# Patient Record
Sex: MALE | Race: WHITE | Employment: OTHER | URBAN - METROPOLITAN AREA
[De-identification: names, ages, dates, MRNs, and addresses within clinical notes are randomized per-mention and may not be internally consistent; named-entity substitution may affect disease eponyms.]

---

## 2017-06-22 ENCOUNTER — TELEPHONE (OUTPATIENT)
Dept: FAMILY MEDICINE CLINIC | Age: 62
End: 2017-06-22

## 2017-09-21 ENCOUNTER — TELEPHONE (OUTPATIENT)
Dept: FAMILY MEDICINE CLINIC | Age: 62
End: 2017-09-21

## 2018-05-24 ENCOUNTER — OFFICE VISIT (OUTPATIENT)
Dept: FAMILY MEDICINE CLINIC | Age: 63
End: 2018-05-24
Payer: MEDICARE

## 2018-05-24 VITALS
HEART RATE: 81 BPM | HEIGHT: 70 IN | BODY MASS INDEX: 31.35 KG/M2 | SYSTOLIC BLOOD PRESSURE: 136 MMHG | DIASTOLIC BLOOD PRESSURE: 78 MMHG | WEIGHT: 219 LBS | TEMPERATURE: 99.1 F | RESPIRATION RATE: 18 BRPM | OXYGEN SATURATION: 96 %

## 2018-05-24 DIAGNOSIS — N52.9 ERECTILE DYSFUNCTION, UNSPECIFIED ERECTILE DYSFUNCTION TYPE: ICD-10-CM

## 2018-05-24 DIAGNOSIS — J43.8 OTHER EMPHYSEMA (HCC): Primary | ICD-10-CM

## 2018-05-24 DIAGNOSIS — I48.0 PAROXYSMAL ATRIAL FIBRILLATION (HCC): ICD-10-CM

## 2018-05-24 PROCEDURE — 99213 OFFICE O/P EST LOW 20 MIN: CPT | Performed by: INTERNAL MEDICINE

## 2018-05-24 PROCEDURE — 93000 ELECTROCARDIOGRAM COMPLETE: CPT | Performed by: INTERNAL MEDICINE

## 2018-05-24 RX ORDER — SILDENAFIL 100 MG/1
100 TABLET, FILM COATED ORAL PRN
Qty: 30 TABLET | Refills: 3 | Status: SHIPPED | OUTPATIENT
Start: 2018-05-24 | End: 2021-01-20

## 2018-05-24 ASSESSMENT — ENCOUNTER SYMPTOMS
COUGH: 1
STRIDOR: 0
ABDOMINAL PAIN: 0
TROUBLE SWALLOWING: 0
CHEST TIGHTNESS: 0
WHEEZING: 0
SHORTNESS OF BREATH: 0
VOMITING: 0
VOICE CHANGE: 0
BLOOD IN STOOL: 0
CONSTIPATION: 0
DIARRHEA: 0
NAUSEA: 0

## 2018-05-24 ASSESSMENT — PATIENT HEALTH QUESTIONNAIRE - PHQ9
1. LITTLE INTEREST OR PLEASURE IN DOING THINGS: 0
SUM OF ALL RESPONSES TO PHQ9 QUESTIONS 1 & 2: 0
SUM OF ALL RESPONSES TO PHQ QUESTIONS 1-9: 0
2. FEELING DOWN, DEPRESSED OR HOPELESS: 0

## 2018-06-29 ENCOUNTER — TELEPHONE (OUTPATIENT)
Dept: FAMILY MEDICINE CLINIC | Age: 63
End: 2018-06-29

## 2018-06-29 ENCOUNTER — OFFICE VISIT (OUTPATIENT)
Dept: FAMILY MEDICINE CLINIC | Age: 63
End: 2018-06-29
Payer: MEDICARE

## 2018-06-29 VITALS
TEMPERATURE: 98.3 F | DIASTOLIC BLOOD PRESSURE: 80 MMHG | SYSTOLIC BLOOD PRESSURE: 120 MMHG | HEART RATE: 68 BPM | OXYGEN SATURATION: 98 %

## 2018-06-29 DIAGNOSIS — J44.1 COPD EXACERBATION (HCC): ICD-10-CM

## 2018-06-29 DIAGNOSIS — J40 BRONCHITIS: Primary | ICD-10-CM

## 2018-06-29 PROCEDURE — 99213 OFFICE O/P EST LOW 20 MIN: CPT | Performed by: NURSE PRACTITIONER

## 2018-06-29 RX ORDER — ALBUTEROL SULFATE 90 UG/1
2 AEROSOL, METERED RESPIRATORY (INHALATION) EVERY 4 HOURS PRN
Qty: 1 INHALER | Refills: 3 | Status: SHIPPED | OUTPATIENT
Start: 2018-06-29 | End: 2020-01-15

## 2018-06-29 RX ORDER — AMOXICILLIN 875 MG/1
875 TABLET, COATED ORAL 2 TIMES DAILY
Qty: 20 TABLET | Refills: 0 | Status: SHIPPED | OUTPATIENT
Start: 2018-06-29 | End: 2018-07-09

## 2018-06-29 RX ORDER — SILDENAFIL CITRATE 20 MG/1
20 TABLET ORAL PRN
Qty: 30 TABLET | Refills: 3 | Status: SHIPPED | OUTPATIENT
Start: 2018-06-29 | End: 2020-01-15

## 2018-06-29 RX ORDER — GUAIFENESIN 600 MG/1
600 TABLET, EXTENDED RELEASE ORAL 2 TIMES DAILY
Qty: 30 TABLET | Refills: 1 | Status: SHIPPED | OUTPATIENT
Start: 2018-06-29 | End: 2019-11-12

## 2018-06-29 ASSESSMENT — ENCOUNTER SYMPTOMS
WHEEZING: 1
VOMITING: 0
HEMOPTYSIS: 0
SWOLLEN GLANDS: 0
RHINORRHEA: 0
COUGH: 1
SPUTUM PRODUCTION: 1
SHORTNESS OF BREATH: 0

## 2018-11-06 ENCOUNTER — NURSE ONLY (OUTPATIENT)
Dept: FAMILY MEDICINE CLINIC | Age: 63
End: 2018-11-06
Payer: COMMERCIAL

## 2018-11-06 DIAGNOSIS — Z23 NEED FOR INFLUENZA VACCINATION: Primary | ICD-10-CM

## 2018-11-06 PROCEDURE — G0008 ADMIN INFLUENZA VIRUS VAC: HCPCS | Performed by: NURSE PRACTITIONER

## 2018-11-06 PROCEDURE — 90686 IIV4 VACC NO PRSV 0.5 ML IM: CPT | Performed by: NURSE PRACTITIONER

## 2018-12-14 ENCOUNTER — TELEPHONE (OUTPATIENT)
Dept: FAMILY MEDICINE CLINIC | Age: 63
End: 2018-12-14

## 2019-10-17 ENCOUNTER — NURSE ONLY (OUTPATIENT)
Dept: FAMILY MEDICINE CLINIC | Age: 64
End: 2019-10-17
Payer: MEDICARE

## 2019-10-17 ENCOUNTER — OFFICE VISIT (OUTPATIENT)
Dept: FAMILY MEDICINE CLINIC | Age: 64
End: 2019-10-17
Payer: MEDICARE

## 2019-10-17 VITALS
RESPIRATION RATE: 16 BRPM | WEIGHT: 207 LBS | DIASTOLIC BLOOD PRESSURE: 80 MMHG | HEART RATE: 96 BPM | HEIGHT: 70 IN | SYSTOLIC BLOOD PRESSURE: 126 MMHG | BODY MASS INDEX: 29.63 KG/M2 | OXYGEN SATURATION: 98 % | TEMPERATURE: 98.7 F

## 2019-10-17 DIAGNOSIS — Z00.00 ENCOUNTER FOR MEDICARE ANNUAL WELLNESS EXAM: Primary | ICD-10-CM

## 2019-10-17 DIAGNOSIS — M25.552 HIP PAIN, ACUTE, LEFT: ICD-10-CM

## 2019-10-17 DIAGNOSIS — Z23 FLU VACCINE NEED: Primary | ICD-10-CM

## 2019-10-17 DIAGNOSIS — Z00.00 ROUTINE GENERAL MEDICAL EXAMINATION AT A HEALTH CARE FACILITY: ICD-10-CM

## 2019-10-17 PROCEDURE — 3017F COLORECTAL CA SCREEN DOC REV: CPT | Performed by: INTERNAL MEDICINE

## 2019-10-17 PROCEDURE — G0008 ADMIN INFLUENZA VIRUS VAC: HCPCS | Performed by: INTERNAL MEDICINE

## 2019-10-17 PROCEDURE — 90662 IIV NO PRSV INCREASED AG IM: CPT | Performed by: INTERNAL MEDICINE

## 2019-10-17 PROCEDURE — G8482 FLU IMMUNIZE ORDER/ADMIN: HCPCS | Performed by: INTERNAL MEDICINE

## 2019-10-17 PROCEDURE — 1123F ACP DISCUSS/DSCN MKR DOCD: CPT | Performed by: INTERNAL MEDICINE

## 2019-10-17 PROCEDURE — G0438 PPPS, INITIAL VISIT: HCPCS | Performed by: INTERNAL MEDICINE

## 2019-10-17 PROCEDURE — 4040F PNEUMOC VAC/ADMIN/RCVD: CPT | Performed by: INTERNAL MEDICINE

## 2019-10-17 ASSESSMENT — LIFESTYLE VARIABLES
HOW OFTEN DURING THE LAST YEAR HAVE YOU FAILED TO DO WHAT WAS NORMALLY EXPECTED FROM YOU BECAUSE OF DRINKING: 0
HAS A RELATIVE, FRIEND, DOCTOR, OR ANOTHER HEALTH PROFESSIONAL EXPRESSED CONCERN ABOUT YOUR DRINKING OR SUGGESTED YOU CUT DOWN: 0
HOW OFTEN DO YOU HAVE A DRINK CONTAINING ALCOHOL: 1
AUDIT TOTAL SCORE: 1
HOW OFTEN DURING THE LAST YEAR HAVE YOU FOUND THAT YOU WERE NOT ABLE TO STOP DRINKING ONCE YOU HAD STARTED: 0
HOW OFTEN DURING THE LAST YEAR HAVE YOU HAD A FEELING OF GUILT OR REMORSE AFTER DRINKING: 0
HAVE YOU OR SOMEONE ELSE BEEN INJURED AS A RESULT OF YOUR DRINKING: 0
HOW OFTEN DO YOU HAVE SIX OR MORE DRINKS ON ONE OCCASION: 0
HOW MANY STANDARD DRINKS CONTAINING ALCOHOL DO YOU HAVE ON A TYPICAL DAY: 0
AUDIT-C TOTAL SCORE: 1
HOW OFTEN DURING THE LAST YEAR HAVE YOU NEEDED AN ALCOHOLIC DRINK FIRST THING IN THE MORNING TO GET YOURSELF GOING AFTER A NIGHT OF HEAVY DRINKING: 0
HOW OFTEN DURING THE LAST YEAR HAVE YOU BEEN UNABLE TO REMEMBER WHAT HAPPENED THE NIGHT BEFORE BECAUSE YOU HAD BEEN DRINKING: 0

## 2019-10-17 ASSESSMENT — PATIENT HEALTH QUESTIONNAIRE - PHQ9
SUM OF ALL RESPONSES TO PHQ QUESTIONS 1-9: 0
SUM OF ALL RESPONSES TO PHQ QUESTIONS 1-9: 0

## 2019-10-29 DIAGNOSIS — M25.552 HIP PAIN, LEFT: Primary | ICD-10-CM

## 2019-10-30 ENCOUNTER — OFFICE VISIT (OUTPATIENT)
Dept: ORTHOPEDIC SURGERY | Age: 64
End: 2019-10-30
Payer: MEDICARE

## 2019-10-30 VITALS — WEIGHT: 207.01 LBS | HEIGHT: 70 IN | BODY MASS INDEX: 29.64 KG/M2

## 2019-10-30 DIAGNOSIS — M16.12 ARTHRITIS OF LEFT HIP: Primary | ICD-10-CM

## 2019-10-30 PROCEDURE — G8417 CALC BMI ABV UP PARAM F/U: HCPCS | Performed by: ORTHOPAEDIC SURGERY

## 2019-10-30 PROCEDURE — 99203 OFFICE O/P NEW LOW 30 MIN: CPT | Performed by: ORTHOPAEDIC SURGERY

## 2019-10-30 PROCEDURE — G8427 DOCREV CUR MEDS BY ELIG CLIN: HCPCS | Performed by: ORTHOPAEDIC SURGERY

## 2019-10-30 PROCEDURE — G8482 FLU IMMUNIZE ORDER/ADMIN: HCPCS | Performed by: ORTHOPAEDIC SURGERY

## 2019-10-30 ASSESSMENT — ENCOUNTER SYMPTOMS
DIARRHEA: 0
NAUSEA: 0
COUGH: 0
CONSTIPATION: 0

## 2019-11-06 ENCOUNTER — TELEPHONE (OUTPATIENT)
Dept: ORTHOPEDIC SURGERY | Age: 64
End: 2019-11-06

## 2019-11-06 DIAGNOSIS — M16.12 ARTHRITIS OF LEFT HIP: ICD-10-CM

## 2019-11-06 DIAGNOSIS — Z01.818 PRE-OP TESTING: Primary | ICD-10-CM

## 2019-11-12 ENCOUNTER — HOSPITAL ENCOUNTER (OUTPATIENT)
Dept: PREADMISSION TESTING | Age: 64
Discharge: HOME | End: 2019-11-16
Payer: COMMERCIAL

## 2019-11-12 ENCOUNTER — HOSPITAL ENCOUNTER (OUTPATIENT)
Dept: GENERAL RADIOLOGY | Age: 64
Discharge: HOME | End: 2019-11-14
Payer: COMMERCIAL

## 2019-11-12 VITALS
TEMPERATURE: 97.8 F | WEIGHT: 209.6 LBS | SYSTOLIC BLOOD PRESSURE: 132 MMHG | BODY MASS INDEX: 30.01 KG/M2 | HEIGHT: 70 IN | HEART RATE: 98 BPM | RESPIRATION RATE: 24 BRPM | DIASTOLIC BLOOD PRESSURE: 84 MMHG

## 2019-11-12 DIAGNOSIS — Z01.818 PRE-OP TESTING: ICD-10-CM

## 2019-11-12 PROCEDURE — 36415 COLL VENOUS BLD VENIPUNCTURE: CPT

## 2019-11-12 PROCEDURE — 87641 MR-STAPH DNA AMP PROBE: CPT

## 2019-11-12 PROCEDURE — 71046 X-RAY EXAM CHEST 2 VIEWS: CPT

## 2019-11-12 PROCEDURE — 85027 COMPLETE CBC AUTOMATED: CPT

## 2019-11-12 PROCEDURE — 83036 HEMOGLOBIN GLYCOSYLATED A1C: CPT

## 2019-11-12 PROCEDURE — 93005 ELECTROCARDIOGRAM TRACING: CPT | Performed by: ORTHOPAEDIC SURGERY

## 2019-11-12 PROCEDURE — 80053 COMPREHEN METABOLIC PANEL: CPT

## 2019-11-12 ASSESSMENT — PAIN DESCRIPTION - LOCATION: LOCATION: SHOULDER;LEG

## 2019-11-12 ASSESSMENT — PAIN DESCRIPTION - PROGRESSION: CLINICAL_PROGRESSION: GRADUALLY WORSENING

## 2019-11-12 ASSESSMENT — PAIN - FUNCTIONAL ASSESSMENT: PAIN_FUNCTIONAL_ASSESSMENT: PREVENTS OR INTERFERES SOME ACTIVE ACTIVITIES AND ADLS

## 2019-11-12 ASSESSMENT — PAIN DESCRIPTION - PAIN TYPE: TYPE: CHRONIC PAIN

## 2019-11-12 ASSESSMENT — PAIN SCALES - GENERAL: PAINLEVEL_OUTOF10: 3

## 2019-11-12 ASSESSMENT — PAIN DESCRIPTION - ONSET: ONSET: AWAKENED FROM SLEEP

## 2019-11-12 ASSESSMENT — PAIN DESCRIPTION - ORIENTATION: ORIENTATION: RIGHT;LEFT

## 2019-11-12 ASSESSMENT — PAIN DESCRIPTION - FREQUENCY: FREQUENCY: INTERMITTENT

## 2019-11-13 ENCOUNTER — HOSPITAL ENCOUNTER (OUTPATIENT)
Age: 64
Setting detail: SPECIMEN
Discharge: HOME | End: 2019-11-13
Payer: COMMERCIAL

## 2019-11-13 DIAGNOSIS — Z01.818 PRE-OP TESTING: ICD-10-CM

## 2019-11-13 LAB
ALBUMIN SERPL-MCNC: 4.3 G/DL (ref 3.5–5.2)
ALBUMIN/GLOBULIN RATIO: 1.2 (ref 1–2.5)
ALP BLD-CCNC: 79 U/L (ref 40–129)
ALT SERPL-CCNC: 16 U/L (ref 5–41)
ANION GAP SERPL CALCULATED.3IONS-SCNC: 19 MMOL/L (ref 9–17)
AST SERPL-CCNC: 17 U/L
AST SERPL-CCNC: 18 U/L
BILIRUB SERPL-MCNC: 0.35 MG/DL (ref 0.3–1.2)
BILIRUBIN URINE: NEGATIVE
BUN BLDV-MCNC: 13 MG/DL (ref 8–23)
BUN/CREAT BLD: ABNORMAL (ref 9–20)
CALCIUM SERPL-MCNC: 9.8 MG/DL (ref 8.6–10.4)
CHLORIDE BLD-SCNC: 98 MMOL/L (ref 98–107)
CHOLESTEROL/HDL RATIO: 2.8
CHOLESTEROL: 182 MG/DL
CO2: 24 MMOL/L (ref 20–31)
COLOR: YELLOW
COMMENT UA: ABNORMAL
CREAT SERPL-MCNC: 0.79 MG/DL (ref 0.7–1.2)
EKG ATRIAL RATE: 88 BPM
EKG Q-T INTERVAL: 342 MS
EKG QRS DURATION: 96 MS
EKG QTC CALCULATION (BAZETT): 416 MS
EKG R AXIS: -73 DEGREES
EKG T AXIS: 52 DEGREES
EKG VENTRICULAR RATE: 89 BPM
GFR AFRICAN AMERICAN: >60 ML/MIN
GFR NON-AFRICAN AMERICAN: >60 ML/MIN
GFR SERPL CREATININE-BSD FRML MDRD: ABNORMAL ML/MIN/{1.73_M2}
GFR SERPL CREATININE-BSD FRML MDRD: ABNORMAL ML/MIN/{1.73_M2}
GLUCOSE BLD-MCNC: 103 MG/DL (ref 70–99)
GLUCOSE URINE: NEGATIVE
HCT VFR BLD CALC: 43.6 % (ref 40.7–50.3)
HDLC SERPL-MCNC: 65 MG/DL
HEMOGLOBIN: 13.8 G/DL (ref 13–17)
KETONES, URINE: NEGATIVE
LDL CHOLESTEROL: 106 MG/DL (ref 0–130)
LEUKOCYTE ESTERASE, URINE: NEGATIVE
MCH RBC QN AUTO: 30.4 PG (ref 25.2–33.5)
MCHC RBC AUTO-ENTMCNC: 31.7 G/DL (ref 28.4–34.8)
MCV RBC AUTO: 96 FL (ref 82.6–102.9)
NITRITE, URINE: NEGATIVE
NRBC AUTOMATED: 0 PER 100 WBC
PDW BLD-RTO: 12.5 % (ref 11.8–14.4)
PH UA: 8.5 (ref 5–8)
PLATELET # BLD: 362 K/UL (ref 138–453)
PMV BLD AUTO: 9.3 FL (ref 8.1–13.5)
POTASSIUM SERPL-SCNC: 4.3 MMOL/L (ref 3.7–5.3)
PROTEIN UA: NEGATIVE
RBC # BLD: 4.54 M/UL (ref 4.21–5.77)
SODIUM BLD-SCNC: 141 MMOL/L (ref 135–144)
SPECIFIC GRAVITY UA: 1.01 (ref 1–1.03)
TOTAL PROTEIN: 7.9 G/DL (ref 6.4–8.3)
TRIGL SERPL-MCNC: 55 MG/DL
TURBIDITY: CLEAR
URINE HGB: NEGATIVE
UROBILINOGEN, URINE: NORMAL
VLDLC SERPL CALC-MCNC: NORMAL MG/DL (ref 1–30)
WBC # BLD: 10.1 K/UL (ref 3.5–11.3)

## 2019-11-14 LAB
ESTIMATED AVERAGE GLUCOSE: 143 MG/DL
HBA1C MFR BLD: 6.6 % (ref 4–6)

## 2019-11-19 ENCOUNTER — TELEPHONE (OUTPATIENT)
Dept: FAMILY MEDICINE CLINIC | Age: 64
End: 2019-11-19

## 2019-11-19 DIAGNOSIS — I48.0 PAROXYSMAL ATRIAL FIBRILLATION (HCC): Primary | ICD-10-CM

## 2019-12-23 RX ORDER — AMOXICILLIN 875 MG/1
875 TABLET, COATED ORAL 2 TIMES DAILY
Qty: 20 TABLET | Refills: 0 | OUTPATIENT
Start: 2019-12-23 | End: 2020-01-02

## 2019-12-23 NOTE — TELEPHONE ENCOUNTER
Pt called stating he needs his meds refilled & also would like to have something for his COPD. Pt does not want the inhaler med he is asking for a pill form for his COPD. Please call & advised. Pharmacy confirmed. Thank you!

## 2019-12-27 RX ORDER — AMOXICILLIN 875 MG/1
875 TABLET, COATED ORAL 2 TIMES DAILY
Qty: 20 TABLET | Refills: 0 | Status: SHIPPED | OUTPATIENT
Start: 2019-12-27 | End: 2020-01-06

## 2019-12-27 RX ORDER — BUDESONIDE AND FORMOTEROL FUMARATE DIHYDRATE 160; 4.5 UG/1; UG/1
2 AEROSOL RESPIRATORY (INHALATION) 2 TIMES DAILY
Qty: 3 INHALER | Refills: 1 | Status: SHIPPED | OUTPATIENT
Start: 2019-12-27 | End: 2021-01-20

## 2020-01-03 ENCOUNTER — TELEPHONE (OUTPATIENT)
Dept: FAMILY MEDICINE CLINIC | Age: 65
End: 2020-01-03

## 2020-01-06 ENCOUNTER — TELEPHONE (OUTPATIENT)
Dept: ORTHOPEDIC SURGERY | Age: 65
End: 2020-01-06

## 2020-01-15 ENCOUNTER — HOSPITAL ENCOUNTER (OUTPATIENT)
Dept: PREADMISSION TESTING | Age: 65
Discharge: HOME | End: 2020-01-19
Payer: MEDICARE

## 2020-01-15 VITALS
WEIGHT: 196.87 LBS | OXYGEN SATURATION: 99 % | HEIGHT: 70 IN | RESPIRATION RATE: 16 BRPM | DIASTOLIC BLOOD PRESSURE: 76 MMHG | BODY MASS INDEX: 28.18 KG/M2 | SYSTOLIC BLOOD PRESSURE: 124 MMHG | HEART RATE: 88 BPM

## 2020-01-15 LAB
-: NORMAL
ALBUMIN SERPL-MCNC: 3.9 G/DL (ref 3.5–5.2)
ALBUMIN/GLOBULIN RATIO: ABNORMAL (ref 1–2.5)
ALP BLD-CCNC: 67 U/L (ref 40–129)
ALT SERPL-CCNC: 15 U/L (ref 5–41)
AMORPHOUS: NORMAL
ANION GAP SERPL CALCULATED.3IONS-SCNC: 12 MMOL/L (ref 9–17)
AST SERPL-CCNC: 14 U/L
BACTERIA: NORMAL
BILIRUB SERPL-MCNC: 0.51 MG/DL (ref 0.3–1.2)
BILIRUBIN URINE: ABNORMAL
BUN BLDV-MCNC: 19 MG/DL (ref 8–23)
BUN/CREAT BLD: 19 (ref 9–20)
CALCIUM SERPL-MCNC: 9.5 MG/DL (ref 8.6–10.4)
CASTS UA: NORMAL /LPF
CHLORIDE BLD-SCNC: 95 MMOL/L (ref 98–107)
CO2: 25 MMOL/L (ref 20–31)
COLOR: YELLOW
COMMENT UA: ABNORMAL
CREAT SERPL-MCNC: 0.99 MG/DL (ref 0.7–1.2)
CRYSTALS, UA: NORMAL /HPF
EPITHELIAL CELLS UA: NORMAL /HPF (ref 0–5)
GFR AFRICAN AMERICAN: >60 ML/MIN
GFR NON-AFRICAN AMERICAN: >60 ML/MIN
GFR SERPL CREATININE-BSD FRML MDRD: ABNORMAL ML/MIN/{1.73_M2}
GFR SERPL CREATININE-BSD FRML MDRD: ABNORMAL ML/MIN/{1.73_M2}
GLUCOSE BLD-MCNC: 164 MG/DL (ref 70–99)
GLUCOSE URINE: NEGATIVE
HCT VFR BLD CALC: 44.6 % (ref 40.7–50.3)
HEMOGLOBIN: 14.4 G/DL (ref 13–17)
KETONES, URINE: NEGATIVE
LEUKOCYTE ESTERASE, URINE: NEGATIVE
MCH RBC QN AUTO: 30.1 PG (ref 25.2–33.5)
MCHC RBC AUTO-ENTMCNC: 32.3 G/DL (ref 28.4–34.8)
MCV RBC AUTO: 93.3 FL (ref 82.6–102.9)
MRSA, DNA, NASAL: NORMAL
MUCUS: NORMAL
NITRITE, URINE: NEGATIVE
NRBC AUTOMATED: 0 PER 100 WBC
OTHER OBSERVATIONS UA: NORMAL
PDW BLD-RTO: 12.6 % (ref 11.8–14.4)
PH UA: 5.5 (ref 5–8)
PLATELET # BLD: 342 K/UL (ref 138–453)
PMV BLD AUTO: 9.1 FL (ref 8.1–13.5)
POTASSIUM SERPL-SCNC: 4.4 MMOL/L (ref 3.7–5.3)
PROTEIN UA: NEGATIVE
RBC # BLD: 4.78 M/UL (ref 4.21–5.77)
RBC UA: NORMAL /HPF (ref 0–2)
RENAL EPITHELIAL, UA: NORMAL /HPF
SODIUM BLD-SCNC: 132 MMOL/L (ref 135–144)
SPECIFIC GRAVITY UA: 1.03 (ref 1–1.03)
SPECIMEN DESCRIPTION: NORMAL
TOTAL PROTEIN: 8.1 G/DL (ref 6.4–8.3)
TRICHOMONAS: NORMAL
TURBIDITY: CLEAR
URINE HGB: NEGATIVE
UROBILINOGEN, URINE: NORMAL
WBC # BLD: 10.1 K/UL (ref 3.5–11.3)
WBC UA: NORMAL /HPF (ref 0–5)
YEAST: NORMAL

## 2020-01-15 PROCEDURE — 86901 BLOOD TYPING SEROLOGIC RH(D): CPT

## 2020-01-15 PROCEDURE — 86870 RBC ANTIBODY IDENTIFICATION: CPT

## 2020-01-15 PROCEDURE — 80053 COMPREHEN METABOLIC PANEL: CPT

## 2020-01-15 PROCEDURE — 81001 URINALYSIS AUTO W/SCOPE: CPT

## 2020-01-15 PROCEDURE — 86900 BLOOD TYPING SEROLOGIC ABO: CPT

## 2020-01-15 PROCEDURE — 87641 MR-STAPH DNA AMP PROBE: CPT

## 2020-01-15 PROCEDURE — 85027 COMPLETE CBC AUTOMATED: CPT

## 2020-01-15 PROCEDURE — 86850 RBC ANTIBODY SCREEN: CPT

## 2020-01-15 PROCEDURE — 36415 COLL VENOUS BLD VENIPUNCTURE: CPT

## 2020-01-15 RX ORDER — ACETAMINOPHEN 500 MG
1000 TABLET ORAL ONCE
Status: CANCELLED | OUTPATIENT
Start: 2020-01-27

## 2020-01-15 RX ORDER — CELECOXIB 200 MG/1
200 CAPSULE ORAL ONCE
Status: CANCELLED | OUTPATIENT
Start: 2020-01-27

## 2020-01-15 ASSESSMENT — HOOS JR
LYING IN BED (TURNING OVER, MAINTAINING HIP POSITION): 2
HOOS JR RAW SCORE: 9
RISING FROM SITTING: 2
WALKING ON UNEVEN SURFACE: 1
GOING UP OR DOWN STAIRS: 1
BENDING TO THE FLOOR TO PICK UP OBJECT: 2
SITTING: 1

## 2020-01-15 ASSESSMENT — PAIN - FUNCTIONAL ASSESSMENT: PAIN_FUNCTIONAL_ASSESSMENT: PREVENTS OR INTERFERES SOME ACTIVE ACTIVITIES AND ADLS

## 2020-01-15 ASSESSMENT — PAIN DESCRIPTION - PAIN TYPE: TYPE: ACUTE PAIN

## 2020-01-15 ASSESSMENT — PAIN DESCRIPTION - LOCATION: LOCATION: HIP

## 2020-01-15 ASSESSMENT — PROMIS GLOBAL HEALTH SCALE
IN GENERAL, HOW WOULD YOU RATE YOUR SATISFACTION WITH YOUR SOCIAL ACTIVITIES AND RELATIONSHIPS [ON A SCALE OF 1 (POOR) TO 5 (EXCELLENT)]?: 2
IN GENERAL, HOW WOULD YOU RATE YOUR MENTAL HEALTH, INCLUDING YOUR MOOD AND YOUR ABILITY TO THINK [ON A SCALE OF 1 (POOR) TO 5 (EXCELLENT)]?: 2
IN GENERAL, PLEASE RATE HOW WELL YOU CARRY OUT YOUR USUAL SOCIAL ACTIVITIES (INCLUDES ACTIVITIES AT HOME, AT WORK, AND IN YOUR COMMUNITY, AND RESPONSIBILITIES AS A PARENT, CHILD, SPOUSE, EMPLOYEE, FRIEND, ETC) [ON A SCALE OF 1 (POOR) TO 5 (EXCELLENT)]?: 4
IN THE PAST 7 DAYS, HOW WOULD YOU RATE YOUR FATIGUE ON AVERAGE [ON A SCALE FROM 1 (NONE) TO 5 (VERY SEVERE)]?: 4
SUM OF RESPONSES TO QUESTIONS 2, 4, 5, & 10: 9
IN THE PAST 7 DAYS, HOW OFTEN HAVE YOU BEEN BOTHERED BY EMOTIONAL PROBLEMS, SUCH AS FEELING ANXIOUS, DEPRESSED, OR IRRITABLE [ON A SCALE FROM 1 (NEVER) TO 5 (ALWAYS)]?: 4
WHO IS THE PERSON COMPLETING THE PROMIS V1.1 SURVEY?: 0
SUM OF RESPONSES TO QUESTIONS 3, 6, 7, & 8: 15
HOW IS THE PROMIS V1.1 BEING ADMINISTERED?: 0
IN GENERAL, WOULD YOU SAY YOUR QUALITY OF LIFE IS...[ON A SCALE OF 1 (POOR) TO 5 (EXCELLENT)]: 1
IN GENERAL, HOW WOULD YOU RATE YOUR PHYSICAL HEALTH [ON A SCALE OF 1 (POOR) TO 5 (EXCELLENT)]?: 2
IN THE PAST 7 DAYS, HOW WOULD YOU RATE YOUR PAIN ON AVERAGE [ON A SCALE FROM 0 (NO PAIN) TO 10 (WORST IMAGINABLE PAIN)]?: 6
TO WHAT EXTENT ARE YOU ABLE TO CARRY OUT YOUR EVERYDAY PHYSICAL ACTIVITIES SUCH AS WALKING, CLIMBING STAIRS, CARRYING GROCERIES, OR MOVING A CHAIR [ON A SCALE OF 1 (NOT AT ALL) TO 5 (COMPLETELY)]?: 3
IN GENERAL, WOULD YOU SAY YOUR HEALTH IS...[ON A SCALE OF 1 (POOR) TO 5 (EXCELLENT)]: 3

## 2020-01-15 ASSESSMENT — PAIN DESCRIPTION - ORIENTATION: ORIENTATION: LEFT

## 2020-01-15 ASSESSMENT — PAIN DESCRIPTION - FREQUENCY: FREQUENCY: INTERMITTENT

## 2020-01-15 ASSESSMENT — PAIN SCALES - GENERAL: PAINLEVEL_OUTOF10: 6

## 2020-01-15 ASSESSMENT — PAIN DESCRIPTION - ONSET: ONSET: ON-GOING

## 2020-01-15 NOTE — PRE-PROCEDURE INSTRUCTIONS
ARRIVE AT [facility] 34 ON Monday, January 27th at 05:30 AM    Once you enter the hospital lobby, take the elevators to the second floor. Check-In is at the surgery registration desk. If you have been given a blood band, you must bring it with you the day of surgery. Continue to take your home medications as you normally do up to and including the night before surgery with the exception of any blood thinning medications. Please stop any blood thinning medications as directed by your surgeon or prescribing physician. Failure to stop certain medications may interfere with your scheduled surgery. These may include:  Aspirin, Warfarin (Coumadin), Clopidogrel (Plavix), Ibuprofen (Motrin, Advil), Naproxen (Aleve), Meloxicam (Mobic), Celecoxib (Celebrex), Eliquis, Pradaxa, Xarelto, Effient, Fish Oil, Herbal supplements. If you are diabetic, do not take any of your diabetic medications by mouth the morning of surgery. If you are taking insulin contact the doctor that manages your diabetes for instructions about any changes to your insulin dosages the day before surgery. Do not inject insulin or other injectable diabetic medications the morning of surgery unless otherwise instructed by the doctor who manages your diabetes. Please take the following medication(s) the day of surgery with a small sip of water:  gabapentin  Please use your inhalers at home the day of surgery. PREPARING FOR YOUR SURGERY:     Before surgery, you can play an important role in your own health. Because skin is not sterile, we need to be sure that your skin is as free of germs as possible before surgery by carefully washing before surgery. Preparing or prepping skin before surgery can reduce the risk of a surgical site infection.   Do not shave the area of your body where your surgery will be performed unless you received specific permission from your physician.     You will need to shower at home the night before surgery and the morning of surgery with a special soap called chlorhexidine gluconate (CHG*). *Not to be used by people allergic to Chlorhexidine Gluconate (CHG). Following these instructions will help you be sure that your skin is clean before surgery. Instructions on cleaning your skin before surgery: The night before your surgery:      You will need to shower with warm water (not hot) and the CHG soap.  Use a clean wash cloth and a clean towel. Have clean clothes available to put on after the shower.    First wash your hair with regular shampoo. Rinse your hair and body thoroughly to remove the shampoo. [facility] Wash your face with your regular soap or water only. Thoroughly rinse your body with warm water from the neck down.  Turn water off to prevent rinsing the soap off too soon.  With a clean wet washcloth and half of the CHG soap in the bottle, lather your entire body from the neck down. Do not use CHG soap near your eyes or ears to avoid injury to those areas.  Wash thoroughly, paying special attention to the area where your surgery will be performed.  Wash your body gently for five (5) minutes. Avoid scrubbing your skin too hard.  Turn the water back on and rinse your body thoroughly.  Pat yourself dry with a clean, soft towel. Do not apply lotion, cream or powder.  Dress with clean freshly washed clothes. The morning of surgery:     Repeat shower following steps above - using remaining half of CHG soap in bottle. Patient Instructions:    [facility] If you are having any type of anesthesia you are to have nothing to eat or drink after midnight the night before your surgery. This includes gum, mints, water or smoking or chewing tobacco.  The only exception to this is a small sip of water to take with any morning dose of heart, blood pressure, or seizure medications. No alcoholic beverages for 24 hours prior to surgery.      Bring your eyeglasses and case with you. No contacts are to be worn the day of surgery. You also may bring your hearing aids.  If you are on C-PAP or Bi-PAP at home and plan on staying in the hospital overnight for your surgery please bring the machine with you. · Do not wear any jewelry or body piercings day of surgery. Also, NO lotion, perfume or deodorant to be used the day of surgery. No nail polish on the operative extremity (arm/leg surgeries)    ·   If you are staying overnight with us, please bring a small bag of personal items.  Please wear loose, comfortable clothing. If you are potentially going to have a cast or brace bring clothing that will fit over them.  In case of illness - If you have cold or flu like symptoms (high fever, runny nose, sore throat, cough, etc.) rash, nausea, vomiting, loose stools, and/or recent contact with someone who has a contagious disease (chicken pox, measles, etc.) Please call your doctor before coming to the hospital.     If your child is having surgery please make arrangements for any other children to be cared for at home on the day of surgery. Other children are not permitted in recovery room and we want you to be able to spend time with the patient. If other arrangements are not available then we suggest that you have a second adult to stay in the waiting room. Day of Surgery/Procedure:    As a patient at [place] [address] you can expect quality medical and nursing care that is centered on your individual needs. Our goal is to make your surgical experience as comfortable as possible    . Transportation After Your Surgery/Procedure: You will need a friend or family member to drive you home after your procedure. Your  must be 25years of age or older and able to sign off on your discharge instructions.   A taxi cab or any other form of public

## 2020-01-15 NOTE — H&P
History and Physical Service   [facility]    HISTORY AND PHYSICAL EXAMINATION            Date of Evaluation: 1/15/2020  Patient name:  Zoe Eckert  MRN:   [id number]  YOB: 1955  PCP:    Lazaro Latif DO    History Obtained From:     Patient, Medical records    History of Present Illness: This is Zoe Eckert a 71 y.o. male who presents for a pre-admission testing appointment for an upcoming anterior approach left total hip arthroplasty by Dr. Uriah John scheduled on 01/27/2020 at 0730 due to left hip arthritis. The patient's chief complaint is intermittent, moderate left hip pain for the past 2 months. The left hip pain causes the pt to limp. The hip is unstable especially while descending stairs. Denies recent falls and injuries. History of A-fib, arrhythmia, CAD, cardiomyopathy, CHF, hyperlipidemia, hypertension, diabetes, left atrial enlargement, mild pulmonary hypertension, COPD, and YOLY. S/p cardiac catheterizations without stent placement in 2004 and in 2014 per pt. S/p cardioversions in 2005, 2014, and 2016. Cardiac ablations in 2014 and 2016. Stress test 01/02/2020 (See paper chart). ECHO 01/02/2020 EF 55-60% (See paper chart). Pt states he experienced severe SOB after he was given Fentanyl during a cardiac procedure. Pt is alert and oriented without apparent distress. Denies chest pain, dyspnea, dizziness, and palpitations. Pt follows-up with Dr. Bella Thomas from cardiology and Dr. Sindy Venegas from endocrinology. Functional Capacity:   1) Pt is not able to walk 2 city blocks or more on level ground due to limping. Pt denies SOB while doing household chores. 2) Pt is able to climb 1 flight of stairs without SOB.     Past Medical History:     Past Medical History:   Diagnosis Date    [facility]fib ([facility]     Abnormal EKG     Arrhythmia     Arthritis     CAD (coronary artery disease)     Cardiomyopathy    Cardiomyopathy ([facility]     per medical record    mmol/L    Chloride 95 (L) 98 - 107 mmol/L    CO2 25 20 - 31 mmol/L    Anion Gap 12 9 - 17 mmol/L    Alkaline Phosphatase 67 40 - 129 U/L    ALT 15 5 - 41 U/L    AST 14 <40 U/L    Total Bilirubin 0.51 0.3 - 1.2 mg/dL    Total Protein 8.1 6.4 - 8.3 g/dL    Alb 3.9 3.5 - 5.2 g/dL    Albumin/Globulin Ratio NOT REPORTED 1.0 - 2.5    GFR Non-African American >60 >60 mL/min    GFR African American >60 >60 mL/min    GFR Comment          GFR Staging NOT REPORTED    Urinalysis    Collection Time: 01/15/20  8:09 AM   Result Value Ref Range    Color, UA YELLOW YELLOW    Turbidity UA CLEAR CLEAR    Glucose, Ur NEGATIVE NEGATIVE    Bilirubin Urine (A) NEGATIVE     Presumptive positive. Unable to confirm due to unavailability of reagent. Ketones, Urine NEGATIVE NEGATIVE    Specific Gravity, UA 1.026 1.005 - 1.030    Urine Hgb NEGATIVE NEGATIVE    pH, UA 5.5 5.0 - 8.0    Protein, UA NEGATIVE NEGATIVE    Urobilinogen, Urine Normal Normal    Nitrite, Urine NEGATIVE NEGATIVE    Leukocyte Esterase, Urine NEGATIVE NEGATIVE    Urinalysis Comments NOT REPORTED    Microscopic Urinalysis    Collection Time: 01/15/20  8:09 AM   Result Value Ref Range    -          WBC, UA 0 TO 2 0 - 5 /HPF    RBC, UA None 0 - 2 /HPF    Casts UA NOT REPORTED /LPF    Crystals UA NOT REPORTED None /HPF    Epithelial Cells UA 2 TO 5 0 - 5 /HPF    Renal Epithelial, Urine NOT REPORTED 0 /HPF    Bacteria, UA NOT REPORTED None    Mucus, UA NOT REPORTED None    Trichomonas, UA NOT REPORTED None    Amorphous, UA NOT REPORTED None    Other Observations UA NOT REPORTED NOT REQ. Yeast, UA NOT REPORTED None       Recent Labs     01/15/20  0809   HGB 14.4   HCT 44.6   WBC 10.1   MCV 93.3   *   K 4.4   CL 95*   CO2 25   BUN 19   CREATININE 0.99   GLUCOSE 164*   AST 14   ALT 15   LABALBU 3.9     EK2019. See Epic. Diagnosis:      1. Left hip arthritis    Plans:     1.  Anterior approach left total hip arthroplasty      Eboni Ordonez, PIPER -

## 2020-01-24 ENCOUNTER — ANESTHESIA EVENT (OUTPATIENT)
Dept: OPERATING ROOM | Age: 65
DRG: 470 | End: 2020-01-24
Payer: MEDICARE

## 2020-01-27 ENCOUNTER — APPOINTMENT (OUTPATIENT)
Dept: GENERAL RADIOLOGY | Age: 65
DRG: 470 | End: 2020-01-27
Attending: ORTHOPAEDIC SURGERY
Payer: MEDICARE

## 2020-01-27 ENCOUNTER — ANESTHESIA (OUTPATIENT)
Dept: OPERATING ROOM | Age: 65
DRG: 470 | End: 2020-01-27
Payer: MEDICARE

## 2020-01-27 ENCOUNTER — HOSPITAL ENCOUNTER (INPATIENT)
Age: 65
LOS: 1 days | Discharge: HOME HEALTH | DRG: 470 | End: 2020-01-28
Attending: ORTHOPAEDIC SURGERY | Admitting: ORTHOPAEDIC SURGERY
Payer: MEDICARE

## 2020-01-27 VITALS — DIASTOLIC BLOOD PRESSURE: 56 MMHG | SYSTOLIC BLOOD PRESSURE: 99 MMHG | TEMPERATURE: 99.1 F | OXYGEN SATURATION: 99 %

## 2020-01-27 PROBLEM — Z96.642 STATUS POST TOTAL HIP REPLACEMENT, LEFT: Status: ACTIVE | Noted: 2020-01-27

## 2020-01-27 LAB
GLUCOSE BLD-MCNC: 132 MG/DL (ref 75–110)
GLUCOSE BLD-MCNC: 165 MG/DL (ref 75–110)
GLUCOSE BLD-MCNC: 264 MG/DL (ref 75–110)
GLUCOSE BLD-MCNC: 296 MG/DL (ref 75–110)

## 2020-01-27 PROCEDURE — 99221 1ST HOSP IP/OBS SF/LOW 40: CPT | Performed by: INTERNAL MEDICINE

## 2020-01-27 PROCEDURE — 3600000015 HC SURGERY LEVEL 5 ADDTL 15MIN: Performed by: ORTHOPAEDIC SURGERY

## 2020-01-27 PROCEDURE — 97162 PT EVAL MOD COMPLEX 30 MIN: CPT

## 2020-01-27 PROCEDURE — 2580000003 HC RX 258: Performed by: STUDENT IN AN ORGANIZED HEALTH CARE EDUCATION/TRAINING PROGRAM

## 2020-01-27 PROCEDURE — 97166 OT EVAL MOD COMPLEX 45 MIN: CPT

## 2020-01-27 PROCEDURE — 6360000002 HC RX W HCPCS: Performed by: NURSE ANESTHETIST, CERTIFIED REGISTERED

## 2020-01-27 PROCEDURE — 3209999900 FLUORO FOR SURGICAL PROCEDURES

## 2020-01-27 PROCEDURE — 6360000002 HC RX W HCPCS: Performed by: ANESTHESIOLOGY

## 2020-01-27 PROCEDURE — 73502 X-RAY EXAM HIP UNI 2-3 VIEWS: CPT

## 2020-01-27 PROCEDURE — 27130 TOTAL HIP ARTHROPLASTY: CPT | Performed by: ORTHOPAEDIC SURGERY

## 2020-01-27 PROCEDURE — 6370000000 HC RX 637 (ALT 250 FOR IP): Performed by: STUDENT IN AN ORGANIZED HEALTH CARE EDUCATION/TRAINING PROGRAM

## 2020-01-27 PROCEDURE — 2500000003 HC RX 250 WO HCPCS: Performed by: NURSE ANESTHETIST, CERTIFIED REGISTERED

## 2020-01-27 PROCEDURE — 6370000000 HC RX 637 (ALT 250 FOR IP): Performed by: ANESTHESIOLOGY

## 2020-01-27 PROCEDURE — C1776 JOINT DEVICE (IMPLANTABLE): HCPCS | Performed by: ORTHOPAEDIC SURGERY

## 2020-01-27 PROCEDURE — 7100000001 HC PACU RECOVERY - ADDTL 15 MIN: Performed by: ORTHOPAEDIC SURGERY

## 2020-01-27 PROCEDURE — 2580000003 HC RX 258: Performed by: ANESTHESIOLOGY

## 2020-01-27 PROCEDURE — 3700000000 HC ANESTHESIA ATTENDED CARE: Performed by: ORTHOPAEDIC SURGERY

## 2020-01-27 PROCEDURE — 2580000003 HC RX 258: Performed by: ORTHOPAEDIC SURGERY

## 2020-01-27 PROCEDURE — 82947 ASSAY GLUCOSE BLOOD QUANT: CPT

## 2020-01-27 PROCEDURE — 97535 SELF CARE MNGMENT TRAINING: CPT

## 2020-01-27 PROCEDURE — 3700000001 HC ADD 15 MINUTES (ANESTHESIA): Performed by: ORTHOPAEDIC SURGERY

## 2020-01-27 PROCEDURE — 97110 THERAPEUTIC EXERCISES: CPT

## 2020-01-27 PROCEDURE — 6360000002 HC RX W HCPCS: Performed by: STUDENT IN AN ORGANIZED HEALTH CARE EDUCATION/TRAINING PROGRAM

## 2020-01-27 PROCEDURE — 1200000000 HC SEMI PRIVATE

## 2020-01-27 PROCEDURE — 0SRB04A REPLACEMENT OF LEFT HIP JOINT WITH CERAMIC ON POLYETHYLENE SYNTHETIC SUBSTITUTE, UNCEMENTED, OPEN APPROACH: ICD-10-PCS | Performed by: ORTHOPAEDIC SURGERY

## 2020-01-27 PROCEDURE — 2709999900 HC NON-CHARGEABLE SUPPLY: Performed by: ORTHOPAEDIC SURGERY

## 2020-01-27 PROCEDURE — 2720000010 HC SURG SUPPLY STERILE: Performed by: ORTHOPAEDIC SURGERY

## 2020-01-27 PROCEDURE — 2500000003 HC RX 250 WO HCPCS: Performed by: ANESTHESIOLOGY

## 2020-01-27 PROCEDURE — 7100000000 HC PACU RECOVERY - FIRST 15 MIN: Performed by: ORTHOPAEDIC SURGERY

## 2020-01-27 PROCEDURE — 3600000005 HC SURGERY LEVEL 5 BASE: Performed by: ORTHOPAEDIC SURGERY

## 2020-01-27 PROCEDURE — 64450 NJX AA&/STRD OTHER PN/BRANCH: CPT | Performed by: ANESTHESIOLOGY

## 2020-01-27 PROCEDURE — 51798 US URINE CAPACITY MEASURE: CPT

## 2020-01-27 PROCEDURE — 6370000000 HC RX 637 (ALT 250 FOR IP): Performed by: INTERNAL MEDICINE

## 2020-01-27 PROCEDURE — P9041 ALBUMIN (HUMAN),5%, 50ML: HCPCS | Performed by: ANESTHESIOLOGY

## 2020-01-27 PROCEDURE — 97530 THERAPEUTIC ACTIVITIES: CPT

## 2020-01-27 DEVICE — ACETABULAR SHELL Ø54 TWO HOLES
Type: IMPLANTABLE DEVICE | Site: HIP | Status: FUNCTIONAL
Brand: MPACT ACETABULAR SYSTEM

## 2020-01-27 DEVICE — FLAT PE  HC LINER Ø 36 / E
Type: IMPLANTABLE DEVICE | Site: HIP | Status: FUNCTIONAL
Brand: MPACT ACETABULAR SYSTEM

## 2020-01-27 DEVICE — FEMORAL HEAD Ø 36 SIZE L
Type: IMPLANTABLE DEVICE | Site: HIP | Status: FUNCTIONAL
Brand: MECTACER BIOLOX DELTA FEMORAL BALL HEAD

## 2020-01-27 DEVICE — MASTERLOC CEMENTLESS TI COATED LAT STEM # 7
Type: IMPLANTABLE DEVICE | Site: HIP | Status: FUNCTIONAL
Brand: MASTERLOC FEMORAL STEMS

## 2020-01-27 RX ORDER — MIDAZOLAM HYDROCHLORIDE 1 MG/ML
INJECTION INTRAMUSCULAR; INTRAVENOUS PRN
Status: DISCONTINUED | OUTPATIENT
Start: 2020-01-27 | End: 2020-01-27 | Stop reason: SDUPTHER

## 2020-01-27 RX ORDER — BUPIVACAINE HYDROCHLORIDE 7.5 MG/ML
INJECTION, SOLUTION INTRASPINAL PRN
Status: DISCONTINUED | OUTPATIENT
Start: 2020-01-27 | End: 2020-01-27 | Stop reason: SDUPTHER

## 2020-01-27 RX ORDER — OXYCODONE HYDROCHLORIDE 5 MG/1
5 TABLET ORAL EVERY 4 HOURS PRN
Status: DISCONTINUED | OUTPATIENT
Start: 2020-01-27 | End: 2020-01-28 | Stop reason: HOSPADM

## 2020-01-27 RX ORDER — NICOTINE POLACRILEX 4 MG
15 LOZENGE BUCCAL PRN
Status: DISCONTINUED | OUTPATIENT
Start: 2020-01-27 | End: 2020-01-28 | Stop reason: HOSPADM

## 2020-01-27 RX ORDER — ONDANSETRON 2 MG/ML
4 INJECTION INTRAMUSCULAR; INTRAVENOUS
Status: COMPLETED | OUTPATIENT
Start: 2020-01-27 | End: 2020-01-27

## 2020-01-27 RX ORDER — HYDROMORPHONE HCL 110MG/55ML
0.5 PATIENT CONTROLLED ANALGESIA SYRINGE INTRAVENOUS EVERY 5 MIN PRN
Status: DISCONTINUED | OUTPATIENT
Start: 2020-01-27 | End: 2020-01-27 | Stop reason: HOSPADM

## 2020-01-27 RX ORDER — PROPOFOL 10 MG/ML
INJECTION, EMULSION INTRAVENOUS CONTINUOUS PRN
Status: DISCONTINUED | OUTPATIENT
Start: 2020-01-27 | End: 2020-01-27 | Stop reason: SDUPTHER

## 2020-01-27 RX ORDER — CELECOXIB 200 MG/1
200 CAPSULE ORAL 2 TIMES DAILY
Status: DISCONTINUED | OUTPATIENT
Start: 2020-01-27 | End: 2020-01-28 | Stop reason: HOSPADM

## 2020-01-27 RX ORDER — DEXTROSE MONOHYDRATE 50 MG/ML
100 INJECTION, SOLUTION INTRAVENOUS PRN
Status: DISCONTINUED | OUTPATIENT
Start: 2020-01-27 | End: 2020-01-28 | Stop reason: HOSPADM

## 2020-01-27 RX ORDER — SODIUM CHLORIDE, SODIUM LACTATE, POTASSIUM CHLORIDE, CALCIUM CHLORIDE 600; 310; 30; 20 MG/100ML; MG/100ML; MG/100ML; MG/100ML
INJECTION, SOLUTION INTRAVENOUS CONTINUOUS
Status: DISCONTINUED | OUTPATIENT
Start: 2020-01-27 | End: 2020-01-28

## 2020-01-27 RX ORDER — LISINOPRIL 5 MG/1
5 TABLET ORAL DAILY
Status: DISCONTINUED | OUTPATIENT
Start: 2020-01-27 | End: 2020-01-28 | Stop reason: HOSPADM

## 2020-01-27 RX ORDER — SODIUM CHLORIDE 0.9 % (FLUSH) 0.9 %
10 SYRINGE (ML) INJECTION PRN
Status: DISCONTINUED | OUTPATIENT
Start: 2020-01-27 | End: 2020-01-27 | Stop reason: HOSPADM

## 2020-01-27 RX ORDER — PHENYLEPHRINE HCL IN 0.9% NACL 1 MG/10 ML
SYRINGE (ML) INTRAVENOUS PRN
Status: DISCONTINUED | OUTPATIENT
Start: 2020-01-27 | End: 2020-01-27 | Stop reason: SDUPTHER

## 2020-01-27 RX ORDER — ONDANSETRON 2 MG/ML
4 INJECTION INTRAMUSCULAR; INTRAVENOUS EVERY 6 HOURS PRN
Status: DISCONTINUED | OUTPATIENT
Start: 2020-01-27 | End: 2020-01-27

## 2020-01-27 RX ORDER — ROSUVASTATIN CALCIUM 10 MG/1
5 TABLET, COATED ORAL DAILY
Status: DISCONTINUED | OUTPATIENT
Start: 2020-01-27 | End: 2020-01-28 | Stop reason: HOSPADM

## 2020-01-27 RX ORDER — TRANEXAMIC ACID 100 MG/ML
INJECTION, SOLUTION INTRAVENOUS PRN
Status: DISCONTINUED | OUTPATIENT
Start: 2020-01-27 | End: 2020-01-27 | Stop reason: SDUPTHER

## 2020-01-27 RX ORDER — CELECOXIB 200 MG/1
200 CAPSULE ORAL ONCE
Status: DISCONTINUED | OUTPATIENT
Start: 2020-01-27 | End: 2020-01-27 | Stop reason: SDUPTHER

## 2020-01-27 RX ORDER — SODIUM CHLORIDE 0.9 % (FLUSH) 0.9 %
10 SYRINGE (ML) INJECTION EVERY 12 HOURS SCHEDULED
Status: DISCONTINUED | OUTPATIENT
Start: 2020-01-27 | End: 2020-01-28 | Stop reason: HOSPADM

## 2020-01-27 RX ORDER — SODIUM CHLORIDE 0.9 % (FLUSH) 0.9 %
10 SYRINGE (ML) INJECTION EVERY 12 HOURS SCHEDULED
Status: DISCONTINUED | OUTPATIENT
Start: 2020-01-27 | End: 2020-01-27 | Stop reason: HOSPADM

## 2020-01-27 RX ORDER — ALBUMIN (HUMAN) 12.5 G/50ML
SOLUTION INTRAVENOUS
Status: DISPENSED
Start: 2020-01-27 | End: 2020-01-27

## 2020-01-27 RX ORDER — LIDOCAINE HYDROCHLORIDE 20 MG/ML
INJECTION, SOLUTION EPIDURAL; INFILTRATION; INTRACAUDAL; PERINEURAL PRN
Status: DISCONTINUED | OUTPATIENT
Start: 2020-01-27 | End: 2020-01-27 | Stop reason: SDUPTHER

## 2020-01-27 RX ORDER — SENNOSIDES 8.6 MG
1 TABLET ORAL 2 TIMES DAILY
Qty: 60 TABLET | Refills: 0 | Status: SHIPPED | OUTPATIENT
Start: 2020-01-27 | End: 2020-09-14 | Stop reason: ALTCHOICE

## 2020-01-27 RX ORDER — ONDANSETRON 4 MG/1
4 TABLET, ORALLY DISINTEGRATING ORAL EVERY 6 HOURS PRN
Status: DISCONTINUED | OUTPATIENT
Start: 2020-01-27 | End: 2020-01-28 | Stop reason: HOSPADM

## 2020-01-27 RX ORDER — PROPOFOL 10 MG/ML
INJECTION, EMULSION INTRAVENOUS PRN
Status: DISCONTINUED | OUTPATIENT
Start: 2020-01-27 | End: 2020-01-27 | Stop reason: SDUPTHER

## 2020-01-27 RX ORDER — ACETAMINOPHEN 500 MG
1000 TABLET ORAL ONCE
Status: COMPLETED | OUTPATIENT
Start: 2020-01-27 | End: 2020-01-27

## 2020-01-27 RX ORDER — DEXAMETHASONE SODIUM PHOSPHATE 10 MG/ML
10 INJECTION INTRAMUSCULAR; INTRAVENOUS ONCE
Status: COMPLETED | OUTPATIENT
Start: 2020-01-27 | End: 2020-01-27

## 2020-01-27 RX ORDER — OXYCODONE HYDROCHLORIDE 5 MG/1
10 TABLET ORAL EVERY 4 HOURS PRN
Status: DISCONTINUED | OUTPATIENT
Start: 2020-01-27 | End: 2020-01-28 | Stop reason: HOSPADM

## 2020-01-27 RX ORDER — SODIUM CHLORIDE 9 MG/ML
INJECTION, SOLUTION INTRAVENOUS CONTINUOUS
Status: DISCONTINUED | OUTPATIENT
Start: 2020-01-27 | End: 2020-01-27

## 2020-01-27 RX ORDER — ACETAMINOPHEN 500 MG
1000 TABLET ORAL ONCE
Status: DISCONTINUED | OUTPATIENT
Start: 2020-01-27 | End: 2020-01-27

## 2020-01-27 RX ORDER — PREGABALIN 75 MG/1
75 CAPSULE ORAL ONCE
Status: COMPLETED | OUTPATIENT
Start: 2020-01-27 | End: 2020-01-27

## 2020-01-27 RX ORDER — REPAGLINIDE 0.5 MG/1
0.5 TABLET ORAL
Status: DISCONTINUED | OUTPATIENT
Start: 2020-01-27 | End: 2020-01-28 | Stop reason: HOSPADM

## 2020-01-27 RX ORDER — SODIUM CHLORIDE 0.9 % (FLUSH) 0.9 %
10 SYRINGE (ML) INJECTION PRN
Status: DISCONTINUED | OUTPATIENT
Start: 2020-01-27 | End: 2020-01-28 | Stop reason: HOSPADM

## 2020-01-27 RX ORDER — ALBUMIN, HUMAN INJ 5% 5 %
25 SOLUTION INTRAVENOUS ONCE
Status: COMPLETED | OUTPATIENT
Start: 2020-01-27 | End: 2020-01-27

## 2020-01-27 RX ORDER — GABAPENTIN 100 MG/1
100 CAPSULE ORAL
Status: DISCONTINUED | OUTPATIENT
Start: 2020-01-27 | End: 2020-01-28 | Stop reason: HOSPADM

## 2020-01-27 RX ORDER — ONDANSETRON 2 MG/ML
4 INJECTION INTRAMUSCULAR; INTRAVENOUS EVERY 6 HOURS PRN
Status: DISCONTINUED | OUTPATIENT
Start: 2020-01-27 | End: 2020-01-28 | Stop reason: HOSPADM

## 2020-01-27 RX ORDER — LIDOCAINE HYDROCHLORIDE 10 MG/ML
INJECTION, SOLUTION EPIDURAL; INFILTRATION; INTRACAUDAL; PERINEURAL CONTINUOUS PRN
Status: DISCONTINUED | OUTPATIENT
Start: 2020-01-27 | End: 2020-01-27 | Stop reason: SDUPTHER

## 2020-01-27 RX ORDER — DOCUSATE SODIUM 100 MG/1
100 CAPSULE, LIQUID FILLED ORAL 2 TIMES DAILY PRN
Qty: 60 CAPSULE | Refills: 0 | Status: SHIPPED | OUTPATIENT
Start: 2020-01-27 | End: 2020-09-14 | Stop reason: ALTCHOICE

## 2020-01-27 RX ORDER — SCOLOPAMINE TRANSDERMAL SYSTEM 1 MG/1
1 PATCH, EXTENDED RELEASE TRANSDERMAL ONCE
Status: DISCONTINUED | OUTPATIENT
Start: 2020-01-27 | End: 2020-01-27

## 2020-01-27 RX ORDER — SODIUM CHLORIDE, SODIUM LACTATE, POTASSIUM CHLORIDE, CALCIUM CHLORIDE 600; 310; 30; 20 MG/100ML; MG/100ML; MG/100ML; MG/100ML
INJECTION, SOLUTION INTRAVENOUS CONTINUOUS
Status: DISCONTINUED | OUTPATIENT
Start: 2020-01-27 | End: 2020-01-27

## 2020-01-27 RX ORDER — LIDOCAINE HYDROCHLORIDE 10 MG/ML
INJECTION, SOLUTION EPIDURAL; INFILTRATION; INTRACAUDAL; PERINEURAL PRN
Status: DISCONTINUED | OUTPATIENT
Start: 2020-01-27 | End: 2020-01-27 | Stop reason: SDUPTHER

## 2020-01-27 RX ORDER — DEXTROSE MONOHYDRATE 25 G/50ML
12.5 INJECTION, SOLUTION INTRAVENOUS PRN
Status: DISCONTINUED | OUTPATIENT
Start: 2020-01-27 | End: 2020-01-28 | Stop reason: HOSPADM

## 2020-01-27 RX ORDER — ROPIVACAINE HYDROCHLORIDE 2 MG/ML
INJECTION, SOLUTION EPIDURAL; INFILTRATION; PERINEURAL PRN
Status: DISCONTINUED | OUTPATIENT
Start: 2020-01-27 | End: 2020-01-27 | Stop reason: SDUPTHER

## 2020-01-27 RX ORDER — OXYCODONE HYDROCHLORIDE 5 MG/1
5 TABLET ORAL EVERY 4 HOURS PRN
Status: DISCONTINUED | OUTPATIENT
Start: 2020-01-27 | End: 2020-01-27

## 2020-01-27 RX ORDER — CELECOXIB 200 MG/1
200 CAPSULE ORAL ONCE
Status: COMPLETED | OUTPATIENT
Start: 2020-01-27 | End: 2020-01-27

## 2020-01-27 RX ORDER — OXYCODONE HYDROCHLORIDE 5 MG/1
10 TABLET ORAL EVERY 4 HOURS PRN
Status: DISCONTINUED | OUTPATIENT
Start: 2020-01-27 | End: 2020-01-27

## 2020-01-27 RX ORDER — TRAMADOL HYDROCHLORIDE 50 MG/1
50 TABLET ORAL ONCE
Status: COMPLETED | OUTPATIENT
Start: 2020-01-27 | End: 2020-01-27

## 2020-01-27 RX ORDER — TRAMADOL HYDROCHLORIDE 50 MG/1
50 TABLET ORAL EVERY 6 HOURS PRN
Status: DISCONTINUED | OUTPATIENT
Start: 2020-01-27 | End: 2020-01-28 | Stop reason: HOSPADM

## 2020-01-27 RX ORDER — OXYCODONE HYDROCHLORIDE AND ACETAMINOPHEN 5; 325 MG/1; MG/1
1-2 TABLET ORAL EVERY 6 HOURS PRN
Qty: 56 TABLET | Refills: 0 | Status: SHIPPED | OUTPATIENT
Start: 2020-01-27 | End: 2020-02-03

## 2020-01-27 RX ORDER — HYDROMORPHONE HCL 110MG/55ML
0.25 PATIENT CONTROLLED ANALGESIA SYRINGE INTRAVENOUS EVERY 5 MIN PRN
Status: DISCONTINUED | OUTPATIENT
Start: 2020-01-27 | End: 2020-01-27 | Stop reason: HOSPADM

## 2020-01-27 RX ORDER — LIDOCAINE HYDROCHLORIDE 10 MG/ML
1 INJECTION, SOLUTION EPIDURAL; INFILTRATION; INTRACAUDAL; PERINEURAL
Status: DISCONTINUED | OUTPATIENT
Start: 2020-01-27 | End: 2020-01-27 | Stop reason: HOSPADM

## 2020-01-27 RX ORDER — ROSUVASTATIN CALCIUM 5 MG/1
5 TABLET, COATED ORAL DAILY
COMMUNITY
End: 2021-03-17

## 2020-01-27 RX ORDER — ACETAMINOPHEN 500 MG
1000 TABLET ORAL EVERY 6 HOURS
Status: DISCONTINUED | OUTPATIENT
Start: 2020-01-27 | End: 2020-01-28 | Stop reason: HOSPADM

## 2020-01-27 RX ADMIN — DEXTROSE MONOHYDRATE 2 G: 50 INJECTION, SOLUTION INTRAVENOUS at 07:52

## 2020-01-27 RX ADMIN — Medication 100 MCG: at 08:48

## 2020-01-27 RX ADMIN — Medication 100 MCG: at 08:06

## 2020-01-27 RX ADMIN — Medication 100 MCG: at 09:20

## 2020-01-27 RX ADMIN — DEXAMETHASONE SODIUM PHOSPHATE 10 MG: 10 INJECTION INTRAMUSCULAR; INTRAVENOUS at 08:12

## 2020-01-27 RX ADMIN — SODIUM CHLORIDE, POTASSIUM CHLORIDE, SODIUM LACTATE AND CALCIUM CHLORIDE: 600; 310; 30; 20 INJECTION, SOLUTION INTRAVENOUS at 23:43

## 2020-01-27 RX ADMIN — GABAPENTIN 100 MG: 100 CAPSULE ORAL at 19:47

## 2020-01-27 RX ADMIN — MIDAZOLAM 2 MG: 1 INJECTION INTRAMUSCULAR; INTRAVENOUS at 07:33

## 2020-01-27 RX ADMIN — ONDANSETRON 4 MG: 2 INJECTION INTRAMUSCULAR; INTRAVENOUS at 10:18

## 2020-01-27 RX ADMIN — PROPOFOL 50 MG: 10 INJECTION, EMULSION INTRAVENOUS at 07:44

## 2020-01-27 RX ADMIN — Medication 200 MCG: at 08:59

## 2020-01-27 RX ADMIN — GABAPENTIN 100 MG: 100 CAPSULE ORAL at 13:13

## 2020-01-27 RX ADMIN — PROPOFOL 20 MG: 10 INJECTION, EMULSION INTRAVENOUS at 07:47

## 2020-01-27 RX ADMIN — Medication 100 MCG: at 08:13

## 2020-01-27 RX ADMIN — ACETAMINOPHEN 1000 MG: 500 TABLET ORAL at 18:19

## 2020-01-27 RX ADMIN — CELECOXIB 200 MG: 200 CAPSULE ORAL at 07:05

## 2020-01-27 RX ADMIN — ROSUVASTATIN CALCIUM 5 MG: 10 TABLET, FILM COATED ORAL at 21:46

## 2020-01-27 RX ADMIN — Medication 200 MCG: at 08:38

## 2020-01-27 RX ADMIN — LIDOCAINE HYDROCHLORIDE 2.5 ML: 10 INJECTION, SOLUTION EPIDURAL; INFILTRATION; INTRACAUDAL; PERINEURAL at 07:40

## 2020-01-27 RX ADMIN — Medication 100 MCG: at 08:51

## 2020-01-27 RX ADMIN — LIDOCAINE HYDROCHLORIDE 20 MG: 20 INJECTION, SOLUTION EPIDURAL; INFILTRATION; INTRACAUDAL; PERINEURAL at 07:44

## 2020-01-27 RX ADMIN — TRANEXAMIC ACID 1000 MG: 1 INJECTION, SOLUTION INTRAVENOUS at 07:58

## 2020-01-27 RX ADMIN — OXYCODONE HYDROCHLORIDE 10 MG: 5 TABLET ORAL at 21:46

## 2020-01-27 RX ADMIN — Medication 100 MCG: at 08:41

## 2020-01-27 RX ADMIN — Medication 200 MCG: at 09:03

## 2020-01-27 RX ADMIN — Medication 100 MCG: at 09:12

## 2020-01-27 RX ADMIN — SODIUM CHLORIDE, POTASSIUM CHLORIDE, SODIUM LACTATE AND CALCIUM CHLORIDE: 600; 310; 30; 20 INJECTION, SOLUTION INTRAVENOUS at 06:41

## 2020-01-27 RX ADMIN — DEXTROSE MONOHYDRATE 2 G: 50 INJECTION, SOLUTION INTRAVENOUS at 15:36

## 2020-01-27 RX ADMIN — INSULIN LISPRO 3 UNITS: 100 INJECTION, SOLUTION INTRAVENOUS; SUBCUTANEOUS at 16:58

## 2020-01-27 RX ADMIN — Medication 100 MCG: at 09:08

## 2020-01-27 RX ADMIN — Medication 100 MCG: at 08:30

## 2020-01-27 RX ADMIN — BUPIVACAINE HYDROCHLORIDE IN DEXTROSE 2 ML: 7.5 INJECTION, SOLUTION SUBARACHNOID at 07:41

## 2020-01-27 RX ADMIN — Medication 200 MCG: at 08:45

## 2020-01-27 RX ADMIN — GABAPENTIN 100 MG: 100 CAPSULE ORAL at 23:43

## 2020-01-27 RX ADMIN — Medication 100 MCG: at 08:55

## 2020-01-27 RX ADMIN — SODIUM CHLORIDE, POTASSIUM CHLORIDE, SODIUM LACTATE AND CALCIUM CHLORIDE: 600; 310; 30; 20 INJECTION, SOLUTION INTRAVENOUS at 06:54

## 2020-01-27 RX ADMIN — ALBUMIN (HUMAN) 25 G: 12.5 INJECTION, SOLUTION INTRAVENOUS at 10:29

## 2020-01-27 RX ADMIN — DEXTROSE MONOHYDRATE 2 G: 50 INJECTION, SOLUTION INTRAVENOUS at 23:43

## 2020-01-27 RX ADMIN — ACETAMINOPHEN 1000 MG: 500 TABLET ORAL at 13:14

## 2020-01-27 RX ADMIN — GABAPENTIN 100 MG: 100 CAPSULE ORAL at 15:37

## 2020-01-27 RX ADMIN — CELECOXIB 200 MG: 200 CAPSULE ORAL at 21:54

## 2020-01-27 RX ADMIN — TRAMADOL HYDROCHLORIDE 50 MG: 50 TABLET, FILM COATED ORAL at 18:19

## 2020-01-27 RX ADMIN — TRAMADOL HYDROCHLORIDE 50 MG: 50 TABLET, FILM COATED ORAL at 07:05

## 2020-01-27 RX ADMIN — Medication 100 MCG: at 08:21

## 2020-01-27 RX ADMIN — TRANEXAMIC ACID 1000 MG: 1 INJECTION, SOLUTION INTRAVENOUS at 09:31

## 2020-01-27 RX ADMIN — REPAGLINIDE 0.5 MG: 0.5 TABLET ORAL at 15:37

## 2020-01-27 RX ADMIN — ACETAMINOPHEN 1000 MG: 500 TABLET ORAL at 07:05

## 2020-01-27 RX ADMIN — PROPOFOL 75 MCG/KG/MIN: 10 INJECTION, EMULSION INTRAVENOUS at 07:44

## 2020-01-27 RX ADMIN — SODIUM CHLORIDE, POTASSIUM CHLORIDE, SODIUM LACTATE AND CALCIUM CHLORIDE: 600; 310; 30; 20 INJECTION, SOLUTION INTRAVENOUS at 09:18

## 2020-01-27 RX ADMIN — INSULIN LISPRO 2 UNITS: 100 INJECTION, SOLUTION INTRAVENOUS; SUBCUTANEOUS at 21:45

## 2020-01-27 RX ADMIN — PREGABALIN 75 MG: 75 CAPSULE ORAL at 07:05

## 2020-01-27 RX ADMIN — Medication 100 MCG: at 09:16

## 2020-01-27 RX ADMIN — LIDOCAINE HYDROCHLORIDE 3 MG/KG/HR: 10 INJECTION, SOLUTION EPIDURAL; INFILTRATION; INTRACAUDAL; PERINEURAL at 11:03

## 2020-01-27 RX ADMIN — CELECOXIB 200 MG: 200 CAPSULE ORAL at 13:17

## 2020-01-27 RX ADMIN — ROPIVACAINE HYDROCHLORIDE 40 ML: 2 INJECTION, SOLUTION EPIDURAL; INFILTRATION at 11:03

## 2020-01-27 RX ADMIN — PROPOFOL: 10 INJECTION, EMULSION INTRAVENOUS at 09:17

## 2020-01-27 ASSESSMENT — PULMONARY FUNCTION TESTS
PIF_VALUE: 1
PIF_VALUE: 0
PIF_VALUE: 1
PIF_VALUE: 0
PIF_VALUE: 1
PIF_VALUE: 0
PIF_VALUE: 1
PIF_VALUE: 0
PIF_VALUE: 1

## 2020-01-27 ASSESSMENT — PAIN SCALES - GENERAL
PAINLEVEL_OUTOF10: 0
PAINLEVEL_OUTOF10: 4
PAINLEVEL_OUTOF10: 3
PAINLEVEL_OUTOF10: 8
PAINLEVEL_OUTOF10: 7

## 2020-01-27 ASSESSMENT — PAIN DESCRIPTION - FREQUENCY: FREQUENCY: INTERMITTENT

## 2020-01-27 ASSESSMENT — PAIN DESCRIPTION - LOCATION
LOCATION: HIP
LOCATION: HIP

## 2020-01-27 ASSESSMENT — PAIN DESCRIPTION - PAIN TYPE
TYPE: SURGICAL PAIN
TYPE: SURGICAL PAIN

## 2020-01-27 ASSESSMENT — PAIN - FUNCTIONAL ASSESSMENT
PAIN_FUNCTIONAL_ASSESSMENT: 0-10
PAIN_FUNCTIONAL_ASSESSMENT: PREVENTS OR INTERFERES SOME ACTIVE ACTIVITIES AND ADLS

## 2020-01-27 ASSESSMENT — PAIN DESCRIPTION - ORIENTATION
ORIENTATION: LEFT
ORIENTATION: LEFT

## 2020-01-27 ASSESSMENT — PAIN DESCRIPTION - ONSET: ONSET: ON-GOING

## 2020-01-27 NOTE — ANESTHESIA PROCEDURE NOTES
Peripheral Block    Patient location during procedure: PACU  Start time: 1/27/2020 11:00 AM  End time: 1/27/2020 11:05 AM  Staffing  Anesthesiologist: Haroon Magana MD  Performed: anesthesiologist   Preanesthetic Checklist  Completed: patient identified, site marked, surgical consent, pre-op evaluation, timeout performed, IV checked, risks and benefits discussed, monitors and equipment checked, anesthesia consent given, oxygen available and patient being monitored  Peripheral Block  Patient position: supine  Prep: ChloraPrep  Patient monitoring: cardiac monitor, continuous pulse ox, frequent blood pressure checks and IV access  Block type: Fascia iliaca  Laterality: left  Injection technique: single-shot  Procedures: ultrasound guided  Local infiltration: lidocaine  Infiltration strength: 1 %  Dose: 3 mL  Provider prep: mask and sterile gloves  Local infiltration: lidocaine  Needle  Needle type: combined needle/nerve stimulator   Needle gauge: 21 G  Needle length: 10 cm  Needle localization: ultrasound guidance  Assessment  Injection assessment: negative aspiration for heme, no paresthesia on injection and local visualized surrounding nerve on ultrasound  Paresthesia pain: none  Slow fractionated injection: yes  Hemodynamics: stable  Additional Notes              Reason for block: post-op pain management and at surgeon's request

## 2020-01-27 NOTE — ANESTHESIA PRE PROCEDURE
Department of Anesthesiology  Preprocedure Note       Name:  Percy Hutchinson   Age:  71 y.o.  :  1955                                          MRN:  [id number]         Date:  2020      Surgeon: Wilman Bishop):  Leslie Smith DO    Procedure: LEFT HIP TOTAL ARTHROPLASTY ANTERIOR APPROACH    MEDACTA (Left )    Medications prior to admission:   Prior to Admission medications    Medication Sig Start Date End Date Taking? Authorizing Provider   rosuvastatin (CRESTOR) 5 MG tablet Take 5 mg by mouth daily   Yes Historical Provider, MD   budesonide-formoterol (SYMBICORT) 160-4.5 MCG/ACT AERO Inhale 2 puffs into the lungs 2 times daily 19  Yes Alex Loera DO   nateglinide (STARLIX) 60 MG tablet Take 60 mg by mouth 3 times daily (before meals)  6/27/15  Yes Historical Provider, MD   lisinopril (PRINIVIL;ZESTRIL) 5 MG tablet Take 5 mg by mouth daily  14  Yes Historical Provider, MD   Dulaglutide (TRULICITY) 1.5 GW/1.5AN SOPN Inject 1.5 mg into the skin once a week Saturday    Historical Provider, MD   Misc. Devices MISC Rolling  Walker 19   Leslie Smith DO   Misc. Devices MISC Shower Bench 19   Leslie Smith DO   Misc. Devices MISC Bedside Commode 19   Evans Epps DO   sildenafil (VIAGRA) 100 MG tablet Take 1 tablet by mouth as needed for Erectile Dysfunction 18   Alex Loera DO   gabapentin (NEURONTIN) 100 MG capsule Take 100 mg by mouth 5 times daily.  14   Historical Provider, MD       Current medications:    Current Facility-Administered Medications   Medication Dose Route Frequency Provider Last Rate Last Dose    lactated ringers infusion   Intravenous Continuous Julián Kate  mL/hr at 20 0654      sodium chloride flush 0.9 % injection 10 mL  10 mL Intravenous 2 times per day Julián Kate MD        sodium chloride flush 0.9 % injection 10 mL  10 mL Intravenous PRN Julián Kate MD        lidocaine PF 1 % injection 1 mL  1 mL Intradermal Once PRN Chance Mcduffie MD        ceFAZolin (ANCEF) 2 g in dextrose 5 % 50 mL IVPB  2 g Intravenous On Call to [address] [place] [address], DO        pregabalin (LYRICA) capsule 75 mg  75 mg Oral Once Edwena Lot, DO        dexamethasone (DECADRON) injection 10 mg  10 mg Intravenous Once Edwena Lot, DO        scopolamine (TRANSDERM-SCOP) transdermal patch 1 patch  1 patch Transdermal Once Edwena Lot, DO        traMADol Evalene Davide) tablet 50 mg  50 mg Oral Once Edwena Lot, DO        tranexamic acid (CYKLOKAPRON) 1,000 mg in dextrose 5 % 100 mL IVPB  1,000 mg Intravenous Once Edwena Lot, DO        acetaminophen (TYLENOL) tablet 1,000 mg  1,000 mg Oral Once Emanuel Caruso MD        celecoxib (CELEBREX) capsule 200 mg  200 mg Oral Once Emanuel Caruso MD           Allergies: Allergies   Allergen Reactions    Fentanyl Shortness Of Breath    Bee Venom     Cherry     Ezetimibe-Simvastatin        Problem List:    Patient Active Problem List   Diagnosis Code    COPD (chronic obstructive pulmonary disease) ([facility] J44.9    Hyperlipidemia E78.5    Cardiomyopathy, nonischemic (HCC) I42.8    A-fib ([facility]) I48.91    Arrhythmia I49.9    Disturbance of sleep G47.9    Abnormal EKG R94.31    Obesity E66.9       Past Medical History:        Diagnosis Date    A-fib ([facility]     Abnormal EKG     Arrhythmia     Arthritis     CAD (coronary artery disease)     Cardiomyopathy    [facility]omy[facility] ([facility]     per medical record    Cardiomyopathy, nonischemic ([facility]     CHF (congestive heart failure) ([facility] 2013    COPD (chronic obstructive pulmonary disease) (HCC)     On Inhalers;doesn't use routinely    Diabetes mellitus (HCC)     Disturbance of sleep     Hyperlipidemia     Hypertension     Left atrial enlargement     per medical record    Mild pulmonary hypertension ([facility]     per medical record    Obesity     YOLY (obstructive sleep apnea)     per medical record. Pt denies. Pt does not wear a CPAP or BiPAP.  Rheumatoid arthritis [facility])        Past Surgical History:        Procedure Laterality Date    CARDIAC CATHETERIZATION  10/2004, 2014    No stents per pt. [address]   &     Ablation    CARDIOVERSION  , ,     COLONOSCOPY      FRACTURE SURGERY      No surgery;just casted       Social History:    Social History     Tobacco Use    Smoking status: Former Smoker     Packs/day: 1.00     Years: 24.00     Pack years: 24.00     Last attempt to quit: 2008     Years since quittin.0    Smokeless tobacco: Never Used   Substance Use Topics    Alcohol use: Yes     Alcohol/week: 0.0 standard drinks     Comment: 5 glasses of wine/weekly                                Counseling given: Not Answered      Vital Signs (Current):   Vitals:    20 0611 20 0624   BP: 123/69    Pulse: 100    Resp: 16    Temp: 98.6 °F (37 °C)    TempSrc: Oral    SpO2: 100%    Weight:  196 lb 13.9 oz (89.3 kg)   Height:  5' 10\" (1.778 m)                                              BP Readings from Last 3 Encounters:   20 123/69   01/15/20 124/76   19 132/84       NPO Status: Time of last liquid consumption: 1800                        Time of last solid consumption: 1600                        Date of last liquid consumption: 20                        Date of last solid food consumption: 20    BMI:   Wt Readings from Last 3 Encounters:   20 196 lb 13.9 oz (89.3 kg)   01/15/20 196 lb 13.9 oz (89.3 kg)   19 209 lb 9.6 oz (95.1 kg)     Body mass index is 28.25 kg/m².     CBC:   Lab Results   Component Value Date    WBC 10.1 01/15/2020    RBC 4.78 01/15/2020    HGB 14.4 01/15/2020    HCT 44.6 01/15/2020    MCV 93.3 01/15/2020    RDW 12.6 01/15/2020     01/15/2020       CMP:   Lab Results   Component Value Date     01/15/2020    K 4.4 01/15/2020    CL 95 01/15/2020    CO2 25 01/15/2020    BUN 19 01/15/2020

## 2020-01-27 NOTE — CARE COORDINATION
Case Management Initial Discharge Plan  Angie Price,         Readmission Risk              Risk of Unplanned Readmission:        7             Met with:patient to discuss discharge plans. Information verified: address, contacts, phone number, , insurance Yes  PCP: Yung Weeks DO  Date of last visit: 2019    Insurance Provider: Stevo Hobbs    Discharge Planning  Current Residence:  Private home  Living Arrangements:  Homa Smith has 1 stories/5 outside  stairs to climb with bilateral hand rails  Support Systems:  Children  Current Services PTA:  none Agency: none  Patient able to perform ADL's:Independent  DME in home:  Elevated toilet seat with arms. Walk in tub with built in seat and hand held shower head. Grab bar in bathroom. DME used to aid ambulation prior to admission:   none  DME used during admission:  walker    Potential Assistance Needed:  [address]: [employer] in [address] Medications:  No  Does patient want to participate in local refill/ meds to beds program?  Yes    Patient agreeable to home care: Yes  Freedom of choice provided:  yes      Type of Home Care Services:  None  Patient expects to be discharged to:  home    Prior SNF/Rehab Placement and Facility: none  Agreeable to SNF/Rehab: No  Mobile of choice provided: n/a   Evaluation: n/a    Expected Discharge date:  20  Follow Up Appointment: Best Day/ Time: Monday AM    Transportation provider: family  Transportation arrangements needed for discharge: No  The Plan for Transition of Care is related to the following treatment goals: therapy services at home after hip replacement surgery    The Patient  was provided with a choice of provider and agrees   with the discharge plan.  [x] Yes [] No    Freedom of choice list was provided with basic dialogue that supports the patient's individualized plan of care/goals, treatment preferences and shares the quality data associated with the providers. [x] Yes [] No    Discharge Plan:   Met with patient to review plan of care. Pt's 16year old son lives with him and can provide assistance when he is home from school. Pt will need front wheeled walker; do not have Dr's face to face note for walker but did fax script for walker to [address] [place] [address] and asked for delivery 1-28. Attempted to check cost of Eliquis 2.5mg BID for 1 month and [address] [place] [address] [phone] states it was rejected. Will give pt coupon for one month free to use. Pt is requesting home care and reviewed list and selects [facility]. Referral to [address] [place] [address] in to see pt today. PLAN  Referral to Jovanny Gregory orderd from [address] [place] [address] asked for delivery 1-28; need  a face to face note from .  Could not confirm cost of Eliquis and voucher for one month free given to pt.     Electronically signed by Chela Ac on 1/27/20 at 2:35 PM

## 2020-01-27 NOTE — PROGRESS NOTES
Pt remains alert and oriented in PACU with no pain and no movement in legs yet due to spinal.  Albumin infusing. Holding ephedrine per Dr. Granado Situ at this time. Will continue to monitor.

## 2020-01-27 NOTE — ANESTHESIA POSTPROCEDURE EVALUATION
Department of Anesthesiology  Postprocedure Note    Patient: Ana Esteves  MRN: [id number]  YOB: 1955  Date of evaluation: 1/27/2020  Time:  12:38 PM     Procedure Summary     Date:  01/27/20 Room / Location:  [place] [address]    Anesthesia Start:  9506 Anesthesia Stop:      Procedure:  LEFT HIP TOTAL ARTHROPLASTY ANTERIOR APPROACH    [facility] (Left Hip) Diagnosis:  (DX LEFT HIP ARTHRITIS)    Surgeon:  Shannan Amaya DO Responsible Provider:  Tracee Maciel MD    Anesthesia Type:  spinal, MAC ASA Status:  3          Anesthesia Type: spinal, MAC    Chencho Phase I: Chencho Score: 10    Chencho Phase II:      Last vitals: Reviewed and per EMR flowsheets.        Anesthesia Post Evaluation    Patient location during evaluation: PACU  Level of consciousness: awake and alert  Complications: no

## 2020-01-27 NOTE — PROGRESS NOTES
assistance  Sit to Supine: Minimal assistance  Transfers  Sit to Stand: Minimal Assistance;2 Person Assistance  Stand to sit: Minimal Assistance  Comment: Pt. numb LLE so cued pt. to stand with wt. in Rt. LE only, hands on RW.  Pt. shifted wt. onto LLE and buckled. Caught by PT/OT. Had pt. return to sitting. Ambulation  Ambulation?: No     Balance  Posture: Good  Sitting - Static: Good  Sitting - Dynamic: Good  Standing - Static: Fair;-(RW)  Exercises  Quad Sets: 10  Gluteal Sets: 10  Hip Flexion: 3  Ankle Pumps: 10     Plan   Plan  Times per week: twice daily/ 7 days/wk  Specific instructions for Next Treatment: REASSESS  Current Treatment Recommendations: Strengthening, ROM, Balance Training(sitting balance only until reassess)  Safety Devices  Type of devices: All fall risk precautions in place, Call light within reach, Gait belt, Patient at risk for falls, Left in bed, Nurse notified, Bed alarm in place         Goals  Short term goals  Time Frame for Short term goals: 6 visits:  Short term goal 1: Pt. to be indep with bed mob, using sheet as leg  for LLE as needed  Short term goal 2: Pt. to be indep with sit to stand tranfser with RW  Short term goal 3: Pt. to be REASSESSED for gait when LLE numbness dissapates  Patient Goals   Patient goals : ambulate; home tomorrow       Therapy Time   Individual Concurrent Group Co-treatment   Time In 1420         Time Out 1455         Minutes 35               Treatment time: 23min. Co-treatment with OT warranted first time up day of surgery. Cotx due to potential risk of decreased sensation, muscle control and proprioception from spinal epidural and/or regional block. Decreased safety and independence requiring 2 skilled therapy professionals to address individual discipline's goals.      Neva Zenaida, PT

## 2020-01-27 NOTE — DISCHARGE INSTR - COC
(chronic obstructive pulmonary disease) ([facility]) J44.9    Hyperlipidemia E78.5    Cardiomyopathy, nonischemic ([facility]) I42.8    A-fib ([facility]) I48.91    Arrhythmia I49.9    Disturbance of sleep G47.9    Abnormal EKG R94.31    Obesity E66.9    Status post total hip replacement, left R16.815       Isolation/Infection:   Isolation          No Isolation        Patient Infection Status     None to display          Nurse Assessment:  Last Vital Signs: /62   Pulse 72   Temp 97.5 °F (36.4 °C)   Resp 20   Ht 5' 10\" (1.778 m)   Wt 196 lb 13.9 oz (89.3 kg)   SpO2 100%   BMI 28.25 kg/m²     Last documented pain score (0-10 scale): Pain Level: 0  Last Weight:   Wt Readings from Last 1 Encounters:   01/27/20 196 lb 13.9 oz (89.3 kg)     Mental Status:  oriented and alert    IV Access:  - None    Nursing Mobility/ADLs:  Walking   Assisted  Transfer  Assisted  Bathing  Assisted  Dressing  Assisted  Toileting  Assisted  Feeding  Independent  Med Admin  Assisted  Med Delivery   whole    Wound Care Documentation and Therapy:        Elimination:  Continence:   · Bowel: Yes  · Bladder: Yes  Urinary Catheter: None   Colostomy/Ileostomy/Ileal Conduit: No       Date of Last BM: 01/26/2020    Intake/Output Summary (Last 24 hours) at 1/27/2020 1515  Last data filed at 1/27/2020 1140  Gross per 24 hour   Intake 2200 ml   Output 300 ml   Net 1900 ml     I/O last 3 completed shifts: In: 2200 [P.O.:100; I.V.:2100]  Out: 300 [Blood:300]    Safety Concerns: At Risk for Falls    Impairments/Disabilities:      None    Nutrition Therapy:  Current Nutrition Therapy:   - Oral Diet:  General    Routes of Feeding: Oral  Liquids: No Restrictions  Daily Fluid Restriction: no  Last Modified Barium Swallow with Video (Video Swallowing Test): not done    Treatments at the Time of Hospital Discharge:   Respiratory Treatments: N/A  Oxygen Therapy:  is not on home oxygen therapy.   Ventilator:    - No ventilator support    Rehab Therapies:

## 2020-01-27 NOTE — PROGRESS NOTES
3: demo I with UB ADLs and min A LB ADLs with DME/AE as needed and good safety  Short term goal 4: demo and verb good understanding of fall prevention techs, EC/WS techs, and possible equip needs for home   Patient Goals   Patient goals : to go home       Therapy Time   Individual Concurrent Group Co-treatment   Time In 1427         Time Out 36         Minutes 39              Co-treatment with PT warranted first time up day of surgery. Cotx due to potential risk of decreased sensation, muscle control and proprioception from spinal epidural and/or regional block. Decreased safety and independence requiring 2 skilled therapy professionals to address individual discipline's goals.      Maria E Zhang, OT

## 2020-01-27 NOTE — CONSULTS
Katie Sifuentes, DO   [facility]. Devices [facility] Bedside Commode 11/8/19   Evans Katie Sifuentes, DO   sildenafil (VIAGRA) 100 MG tablet Take 1 tablet by mouth as needed for Erectile Dysfunction 5/24/18   Lazaro Latif DO   gabapentin (NEURONTIN) 100 MG capsule Take 100 mg by mouth 5 times daily. 7/2/14   Historical Provider, MD        Allergies:     Fentanyl; Bee venom; Cherry; and Ezetimibe-simvastatin    Social History:     Tobacco:    reports that he quit smoking about 12 years ago. He has a 24.00 pack-year smoking history. He has never used smokeless tobacco.  Alcohol:      reports current alcohol use. Drug Use:  reports no history of drug use. Family History:     Family History   Problem Relation Age of Onset    Diabetes Mother     Heart Disease Mother        Review of Systems:     Positive and Negative as described in HPI. CONSTITUTIONAL:  negative for fevers, chills, sweats, fatigue, weight loss  HEENT:  negative for vision, hearing changes, runny nose, throat pain  RESPIRATORY:  negative for shortness of breath, cough, congestion, wheezing. CARDIOVASCULAR:  negative for chest pain, palpitations.   GASTROINTESTINAL:  negative for nausea, vomiting, diarrhea, constipation, change in bowel habits, abdominal pain   GENITOURINARY:  negative for difficulty of urination, burning with urination, frequency   INTEGUMENT:  negative for rash, skin lesions, easy bruising   HEMATOLOGIC/LYMPHATIC:  negative for swelling/edema   ALLERGIC/IMMUNOLOGIC:  negative for urticaria , itching  ENDOCRINE:  negative increase in drinking, increase in urination, hot or cold intolerance  MUSCULOSKELETAL: Positive for joint swelling and joint pains, before muscle aches apparently has recently been diagnosed with rheumatoid arthritis but denies any serological testing  NEUROLOGICAL:  negative for headaches, dizziness, lightheadedness, numbness, pain, tingling extremities  BEHAVIOR/PSYCH:  negative for depression, anxiety    Physical Exam: Views)    Result Date: 1/27/2020  Intraprocedural fluoroscopic spot images as above. See separate procedure report for more information. Xr Hip 2-3 Vw W Pelvis Left    Result Date: 1/27/2020  Total hip arthropasty without acute hardware complication. Assessment :      Hospital Problems           Last Modified POA    * (Principal) Status post total hip replacement, left 1/27/2020 Yes    COPD without exacerbation ([address] 1/27/2020 Yes    Cardiomyopathy, nonischemic ([address] 1/27/2020 Yes    Rheumatoid arthritis ([address] 1/27/2020 Yes    Type 2 diabetes mellitus without complication, without long-term current use of insulin ([address] 1/27/2020 Yes    Essential hypertension 1/27/2020 Yes          Plan:     1. Insulin scale for glycemic control  2. Continue home antihypertensives with hold parameters  3. Check rheumatoid factor  4. Oxygen and aerosols as needed  5. Postop pain control per orthopedics  6. DVT prophylaxis per orthopedics  7. PT and OT evaluate and treat  8. Continue home maintenance medications  9.  Thank you for consultation, we will follow with you    Consultations:   [facility],   1/27/2020  4:46 PM    Copy sent to Dr. Ortiz Bliss DO

## 2020-01-27 NOTE — H&P
History and Physical Update    Pt Name: Nyasia Willett  MRN: [id number]  YOB: 1955  Date of evaluation: 1/27/2020      [x] I have reviewed the H&P by Keron Durham NP on 1/15/20   which meets the criteria for an Interval History and Physical note and is attached below. [x] I have examined  Nyasia Willett  There are no changes to the patient who is scheduled for a left hip arthroplasty by Dr. Jenny Stubbs. The patient denies health changes, fever, chills, productive cough, SOB, skin changes or chest pain. Vital signs: /69   Pulse 100   Temp 98.6 °F (37 °C) (Oral)   Resp 16   Ht 5' 10\" (1.778 m)   Wt 196 lb 13.9 oz (89.3 kg)   SpO2 100%   BMI 28.25 kg/m²     Allergies:  Fentanyl; Bee venom; Cherry; and Ezetimibe-simvastatin    Medications:    Prior to Admission medications    Medication Sig Start Date End Date Taking? Authorizing Provider   rosuvastatin (CRESTOR) 5 MG tablet Take 5 mg by mouth daily   Yes Historical Provider, MD   budesonide-formoterol (SYMBICORT) 160-4.5 MCG/ACT AERO Inhale 2 puffs into the lungs 2 times daily 12/27/19  Yes Yuniel Rowe DO   nateglinide (STARLIX) 60 MG tablet Take 60 mg by mouth 3 times daily (before meals)  6/27/15  Yes Historical Provider, MD   lisinopril (PRINIVIL;ZESTRIL) 5 MG tablet Take 5 mg by mouth daily  9/25/14  Yes Historical Provider, MD   Dulaglutide (TRULICITY) 1.5 RF/8.2QA SOPN Inject 1.5 mg into the skin once a week Saturday    Historical Provider, MD   Misc. Devices MISC Rolling  Walker 11/8/19   Alejandra Mccain, DO   Misc. Devices MISC Shower Bench 11/8/19   Alejandra Mccain, DO   Misc. Devices MISC Bedside Commode 11/8/19   Evans Gray, DO   sildenafil (VIAGRA) 100 MG tablet Take 1 tablet by mouth as needed for Erectile Dysfunction 5/24/18   Yuniel Rowe DO   gabapentin (NEURONTIN) 100 MG capsule Take 100 mg by mouth 5 times daily.  7/2/14   Historical Provider, MD       This is a 71 y.o. male who is pleasant, cooperative, alert and oriented x3, in no acute distress. Heart: Heart sounds are normal.  HR regular rate and rhythm without murmur, gallop or rub. Lungs: Normal respiratory effort with good air exchange and clear to auscultation without wheezes or rales bilaterally   Abdomen: soft, nontender, nondistended with bowel sounds . Labs:  Recent Labs     01/15/20  0809   HGB 14.4   HCT 44.6   WBC 10.1   MCV 93.3      *   K 4.4   CL 95*   CO2 25   BUN 19   CREATININE 0.99   GLUCOSE 164*   AST 14   ALT 15   LABALBU 3.9       FATMATA SALDAÑA, ANP-BC  Electronically signed 1/27/2020 at 6:29 AM        Merle Lundberg, APRN - CNP   Nurse Practitioner   General Surgery   H&P   Signed   Date of Service:  1/15/2020  8:00 AM          Related encounter: Pre-Admission Testing Visit from 1/15/2020 in [facility] PRE-ADMIT TESTING         Signed        Expand All Collapse All     Show:Clear all  [x]Manual[x]Template[]Copied    Added by:  [x]PIPER Goldberg CNP    []Chava for details  History and Physical Service   [address] [place] [address]            Date of Evaluation:     1/15/2020  Patient name:              Kecia Gay  MRN:                           [id number]  YOB: 1955  PCP:                            Imelda Vargas DO     History Obtained From:      Patient, Medical records     History of Present Illness: This is Kecia Gay a 71 y.o. male who presents for a pre-admission testing appointment for an upcoming anterior approach left total hip arthroplasty by Dr. Olivia Magallanes scheduled on 01/27/2020 at 0730 due to left hip arthritis. The patient's chief complaint is intermittent, moderate left hip pain for the past 2 months. The left hip pain causes the pt to limp. The hip is unstable especially while descending stairs. Denies recent falls and injuries.       History of A-fib, arrhythmia, CAD, cardiomyopathy, CHF, hyperlipidemia, 2014, 2016    COLONOSCOPY        FRACTURE SURGERY         No surgery;just casted            Medications Prior to Admission:      Home Medications           Prior to Admission medications    Medication Sig Start Date End Date Taking? Authorizing Provider   Dulaglutide (TRULICITY) 1.5 HARRIS/4.1GZ SOPN Inject 1.5 mg into the skin once a week Saturday     Yes Historical Provider, MD   budesonide-formoterol (SYMBICORT) 160-4.5 MCG/ACT AERO Inhale 2 puffs into the lungs 2 times daily 12/27/19     Joshua Hickman DO   Misc. Devices MISC Rolling  Walker 11/8/19     [place], DO   Misc. Devices MISC Shower Bench 11/8/19     Abhi Ramirez, DO   Misc. Devices MISC Bedside Commode 11/8/19     Evans Vang, DO   sildenafil (VIAGRA) 100 MG tablet Take 1 tablet by mouth as needed for Erectile Dysfunction 5/24/18     Joshua Hickman DO   atorvastatin (LIPITOR) 20 MG tablet Take 20 mg by mouth daily  9/4/15     Historical Provider, MD   nateglinide (STARLIX) 60 MG tablet Take 60 mg by mouth 3 times daily (before meals)  6/27/15     Historical Provider, MD   gabapentin (NEURONTIN) 100 MG capsule Take 100 mg by mouth 5 times daily. 7/2/14     Historical Provider, MD   lisinopril (PRINIVIL;ZESTRIL) 5 MG tablet Take 5 mg by mouth daily  9/25/14     Historical Provider, MD            Allergies:      Fentanyl; Bee venom; Cherry; and Ezetimibe-simvastatin     Social History:      Tobacco:    reports that he quit smoking about 12 years ago. He has a 24.00 pack-year smoking history. He has never used smokeless tobacco.  Alcohol:      reports current alcohol use. Drug Use:  reports no history of drug use. Safety: Pt has a built-in seat in his shower. Family History:      Family History         Family History   Problem Relation Age of Onset    Diabetes Mother      Heart Disease Mother              Review of Systems:      Positive and Negative as described in HPI.      CONSTITUTIONAL: Unintentional weight loss of 24 pounds in the past 6 months. Negative for fevers, chills, sweats, and fatigue. HEENT: Negative for glasses, hearing changes, rhinorrhea, and throat pain. RESPIRATORY: Negative for shortness of breath, cough, congestion, and wheezing. CARDIOVASCULAR: History of A-fib. Negative for chest pain, blood clot, and palpitations. GASTROINTESTINAL: Negative for reflux, nausea, vomiting, diarrhea, constipation, change in bowel habits, and abdominal pain. GENITOURINARY: Negative for difficulty of urination, burning with urination, and frequency. INTEGUMENT: Negative for rash, skin lesions, and easy bruising. Instructed pt to call Dr. Jenny Stubbs as soon as possible if a rash or wound develops prior to surgery. Pt voiced understanding. HEMATOLOGIC/LYMPHATIC: Negative for swelling/edema. ALLERGIC/IMMUNOLOGIC: Negative for urticaria and itching. ENDOCRINE: Diabetes. Last A1C was around 5.0% per pt. Negative for increase in thirst, increase in urination, and heat or cold intolerance. MUSCULOSKELETAL: See HPI. NEUROLOGICAL: Infrequent headaches. Negative for dizziness, lightheadedness, numbness, and tingling extremities. Pt denies history of seizures and strokes. BEHAVIOR/PSYCH: Negative for depression and anxiety. Physical Exam:   /76   Pulse 88   Resp 16   Ht 5' 10\" (1.778 m)   Wt 196 lb 13.9 oz (89.3 kg)   SpO2 99%   BMI 28.25 kg/m²   No results for input(s): POCGLU in the last 72 hours. General Appearance:  Alert, well appearing, and in no acute distress. Mental status:  Oriented to person, place, and time. Head:  Normocephalic and atraumatic. Eye:  No icterus, redness, pupils equal and reactive, extraocular eye movements intact, and conjunctiva clear. Ear:  Hearing grossly intact. Nose:  No drainage noted. Mouth:  Mucous membranes moist.  Neck:  Supple and no carotid bruits noted.   Lungs:  Bilateral equal air entry, clear to auscultation, no wheezing, rales or rhonchi, and normal

## 2020-01-27 NOTE — PLAN OF CARE
Patient is a fall risk during this admission. Fall risk assessment was performed. Patient is absent of falls. Bed is in the lowest position. Wheels on the bed are locked. Call light and bed side table are within reach. Clutter is removed. Patient was educated to call out when needing assistance or wanting to get out of bed. Patient offered toileting assistance during rounding. Hourly rounds have been performed. Problem: Falls - Risk of:  Goal: Will remain free from falls  Description  Will remain free from falls  Outcome: Ongoing  Goal: Absence of physical injury  Description  Absence of physical injury  Outcome: Ongoing     Problem: Discharge Planning:  Goal: Knowledge of discharge instructions  Description  Knowledge of discharge instructions  Outcome: Ongoing     Problem: Infection - Surgical Site:  Goal: Signs of wound healing will improve  Description  Signs of wound healing will improve  Outcome: Ongoing     Problem: Mobility - Impaired:  Goal: Achieve maximum mobility level  Description  Achieve maximum mobility level  Outcome: Ongoing     Problem: Pain - Acute:  Goal: Pain level will decrease  Description  Pain level will decrease  Outcome: Ongoing  Pt medicated with pain medication prn. Assessed all pain characteristics including level, type, location, frequency, and onset. Non-pharmacologic interventions offered to pt as well. Pt states pain is tolerable at this time.  Will continue to monitor

## 2020-01-27 NOTE — PROGRESS NOTES
Dr. oJby Chen at bedside. Pt pressures decreasing in PACU after post op xrays. Pt remains alert and oriented with complaints of being nauseated. 4mg zofran given IV. Dr. Joby Chen ordering 5% albumin in 500ml bottle, as well as owen and ephederine to boost pressures.

## 2020-01-27 NOTE — ANESTHESIA PROCEDURE NOTES
Spinal Block    Patient location during procedure: OR  Start time: 1/27/2020 7:38 AM  End time: 1/27/2020 7:41 AM  Reason for block: primary anesthetic  Staffing  Anesthesiologist: Tracee Maciel MD  Resident/CRNA: PIPER Thorne CRNA  Performed: resident/CRNA   Preanesthetic Checklist  Completed: patient identified, site marked, surgical consent, pre-op evaluation, timeout performed, IV checked, risks and benefits discussed, monitors and equipment checked, anesthesia consent given, oxygen available and patient being monitored  Spinal Block  Patient position: sitting  Prep: ChloraPrep  Patient monitoring: continuous pulse ox and frequent blood pressure checks  Approach: midline  Location: L3/L4  Procedures: paresthesia technique  Provider prep: mask and sterile gloves  Local infiltration: lidocaine  Agent: bupivacaine  Dose: 2  Dose: 2  Needle  Needle type: Pencan   Needle gauge: 24 G  Needle length: 3.5 in  Assessment  Sensory level: T10  Events: cerebrospinal fluid  Swirl obtained: Yes  CSF: clear  Attempts: 1

## 2020-01-27 NOTE — PROGRESS NOTES
Ortho Coordinator Daily Rounding Note    Admission Date:   1/27/2020 Lee Ann Devi is a 71 y.o.  male    Post Op Day # 0 LTHA    Labs:         No results for input(s): WBC, HGB, PLT in the last 72 hours. No results for input(s): NA, K, CL, CO2, BUN, CREATININE, GLUCOSE in the last 72 hours. Met with:     Patient  Patient's LOC:   alert and oriented X3  Patient progress   FAIR  Patient's Pain:   Patient rates pain 0  Oral Intake:    good  Output:    PT IS JUST S/P LTHA   Singh in:   no  ON-Q:    no    Walker/DME:  Walker/DME needed:  Yes  DME needed:    walker   DME Agency:    GAVE RX TO DCP    Anticoagulation:  Anticoagulation:  2.5 ELIQUIS BID X 30 DAYS  Prescription in chart:  yes     Continuity of Care: The [address] form is on the chart. The [address] form is not signed by the physician. Discharge Planning:  Confirmed with patient that discharge plan is Home. Agency/Facility chosen: NONE  Awaiting pre-certification no  Anticipated discharge date: 01/28/2020. Patient's questions and concerns were answered. Actively listened and provided reassurance.      Electronically signed by: Emmanuelle Reynoso RN on 1/27/2020 at 12:36 PM

## 2020-01-28 VITALS
SYSTOLIC BLOOD PRESSURE: 108 MMHG | DIASTOLIC BLOOD PRESSURE: 63 MMHG | TEMPERATURE: 98.1 F | BODY MASS INDEX: 28.18 KG/M2 | HEART RATE: 80 BPM | RESPIRATION RATE: 16 BRPM | WEIGHT: 196.87 LBS | OXYGEN SATURATION: 98 % | HEIGHT: 70 IN

## 2020-01-28 LAB
ANION GAP SERPL CALCULATED.3IONS-SCNC: 9 MMOL/L (ref 9–17)
BUN BLDV-MCNC: 22 MG/DL (ref 8–23)
BUN/CREAT BLD: 24 (ref 9–20)
CALCIUM SERPL-MCNC: 8.8 MG/DL (ref 8.6–10.4)
CHLORIDE BLD-SCNC: 97 MMOL/L (ref 98–107)
CO2: 26 MMOL/L (ref 20–31)
CREAT SERPL-MCNC: 0.92 MG/DL (ref 0.7–1.2)
GFR AFRICAN AMERICAN: >60 ML/MIN
GFR NON-AFRICAN AMERICAN: >60 ML/MIN
GFR SERPL CREATININE-BSD FRML MDRD: ABNORMAL ML/MIN/{1.73_M2}
GFR SERPL CREATININE-BSD FRML MDRD: ABNORMAL ML/MIN/{1.73_M2}
GLUCOSE BLD-MCNC: 207 MG/DL (ref 75–110)
GLUCOSE BLD-MCNC: 229 MG/DL (ref 70–99)
GLUCOSE BLD-MCNC: 271 MG/DL (ref 75–110)
HCT VFR BLD CALC: 28.4 % (ref 40.7–50.3)
HEMOGLOBIN: 9.1 G/DL (ref 13–17)
MCH RBC QN AUTO: 30 PG (ref 25.2–33.5)
MCHC RBC AUTO-ENTMCNC: 32 G/DL (ref 28.4–34.8)
MCV RBC AUTO: 93.7 FL (ref 82.6–102.9)
NRBC AUTOMATED: 0 PER 100 WBC
PDW BLD-RTO: 12.3 % (ref 11.8–14.4)
PLATELET # BLD: 281 K/UL (ref 138–453)
PMV BLD AUTO: 9.3 FL (ref 8.1–13.5)
POTASSIUM SERPL-SCNC: 4.6 MMOL/L (ref 3.7–5.3)
RBC # BLD: 3.03 M/UL (ref 4.21–5.77)
RHEUMATOID FACTOR: 51.2 IU/ML
SODIUM BLD-SCNC: 132 MMOL/L (ref 135–144)
WBC # BLD: 19.8 K/UL (ref 3.5–11.3)

## 2020-01-28 PROCEDURE — 80048 BASIC METABOLIC PNL TOTAL CA: CPT

## 2020-01-28 PROCEDURE — 86902 BLOOD TYPE ANTIGEN DONOR EA: CPT

## 2020-01-28 PROCEDURE — 6370000000 HC RX 637 (ALT 250 FOR IP): Performed by: INTERNAL MEDICINE

## 2020-01-28 PROCEDURE — 6370000000 HC RX 637 (ALT 250 FOR IP): Performed by: STUDENT IN AN ORGANIZED HEALTH CARE EDUCATION/TRAINING PROGRAM

## 2020-01-28 PROCEDURE — 86431 RHEUMATOID FACTOR QUANT: CPT

## 2020-01-28 PROCEDURE — 97164 PT RE-EVAL EST PLAN CARE: CPT

## 2020-01-28 PROCEDURE — 99231 SBSQ HOSP IP/OBS SF/LOW 25: CPT | Performed by: INTERNAL MEDICINE

## 2020-01-28 PROCEDURE — 97110 THERAPEUTIC EXERCISES: CPT

## 2020-01-28 PROCEDURE — 82947 ASSAY GLUCOSE BLOOD QUANT: CPT

## 2020-01-28 PROCEDURE — 36415 COLL VENOUS BLD VENIPUNCTURE: CPT

## 2020-01-28 PROCEDURE — 97168 OT RE-EVAL EST PLAN CARE: CPT

## 2020-01-28 PROCEDURE — 97116 GAIT TRAINING THERAPY: CPT

## 2020-01-28 PROCEDURE — 97535 SELF CARE MNGMENT TRAINING: CPT

## 2020-01-28 PROCEDURE — 85027 COMPLETE CBC AUTOMATED: CPT

## 2020-01-28 RX ADMIN — LISINOPRIL 5 MG: 5 TABLET ORAL at 08:37

## 2020-01-28 RX ADMIN — OXYCODONE HYDROCHLORIDE 5 MG: 5 TABLET ORAL at 11:48

## 2020-01-28 RX ADMIN — REPAGLINIDE 0.5 MG: 0.5 TABLET ORAL at 06:44

## 2020-01-28 RX ADMIN — APIXABAN 2.5 MG: 2.5 TABLET, FILM COATED ORAL at 06:44

## 2020-01-28 RX ADMIN — CELECOXIB 200 MG: 200 CAPSULE ORAL at 08:36

## 2020-01-28 RX ADMIN — GABAPENTIN 100 MG: 100 CAPSULE ORAL at 11:48

## 2020-01-28 RX ADMIN — ACETAMINOPHEN 1000 MG: 500 TABLET ORAL at 01:07

## 2020-01-28 RX ADMIN — ACETAMINOPHEN 1000 MG: 500 TABLET ORAL at 08:38

## 2020-01-28 RX ADMIN — INSULIN LISPRO 3 UNITS: 100 INJECTION, SOLUTION INTRAVENOUS; SUBCUTANEOUS at 11:47

## 2020-01-28 RX ADMIN — GABAPENTIN 100 MG: 100 CAPSULE ORAL at 06:44

## 2020-01-28 RX ADMIN — INSULIN LISPRO 2 UNITS: 100 INJECTION, SOLUTION INTRAVENOUS; SUBCUTANEOUS at 08:37

## 2020-01-28 RX ADMIN — REPAGLINIDE 0.5 MG: 0.5 TABLET ORAL at 11:48

## 2020-01-28 ASSESSMENT — PAIN DESCRIPTION - PAIN TYPE
TYPE: SURGICAL PAIN
TYPE: SURGICAL PAIN

## 2020-01-28 ASSESSMENT — PAIN DESCRIPTION - ORIENTATION
ORIENTATION: LEFT
ORIENTATION: LEFT

## 2020-01-28 ASSESSMENT — PAIN SCALES - GENERAL
PAINLEVEL_OUTOF10: 2
PAINLEVEL_OUTOF10: 3
PAINLEVEL_OUTOF10: 5
PAINLEVEL_OUTOF10: 0
PAINLEVEL_OUTOF10: 2
PAINLEVEL_OUTOF10: 5
PAINLEVEL_OUTOF10: 3

## 2020-01-28 ASSESSMENT — PAIN DESCRIPTION - LOCATION
LOCATION: HIP
LOCATION: HIP

## 2020-01-28 NOTE — PROGRESS NOTES
Progress Note    Patient:  Kaz Ballesteros  YOB: 1955     71 y.o. male    Subjective:  Patient seen and examined  No issue overnight  Pain controlled  Denies fever, HA, CP, SOB, N/V  Nursing reports issues with urination yesterday but patient refuses catheterization  PT work yesterday but minimal ambulation due to nerve block  Asking about walker for home use, wants to go home today  Denying narcotic pain medicine other than tramadol    Objective:   Vitals:    01/28/20 0635   BP: 109/62   Pulse: 77   Resp: 18   Temp: 97.9 °F (36.6 °C)   SpO2: 96%     Gen: NAD, cooperative   LLE: Optifoam dressing in place, C/D/I. No ecchymoses, abrasions, deformity, or lacerations. Appropriately tender to palpation. Compartments soft. EHL/FHL/TA/GS complex motor intact 5/5. Sural, saphenous, superificial/deep peroneal, and plantar nerve distribution sensation grossly intact. Dorsalis pedis/posterior tibial pulses 2+ with BCR. Recent Labs     01/28/20  0600   WBC 19.8*   HGB 9.1*   HCT 28.4*      *   K 4.6   BUN 22   CREATININE 0.92   GLUCOSE 229*      Meds: Eliquis, celebrex, neurontin, tramadol, tylenol  See rec for complete list    Impression/plan: 71 y.o. male s/p L anterior total hip arthroplasty with Dr. Del Cole, POD#1    -Maintain dressing at all times. -WB status: WBAT LLE, ok to begin ambulating with PT today and walker assist.   -DME ordered, walker to be delivered to bedside before discharge  -Pain control as above  -DVT ppx: Eliquis  -Ice/Elevate as needed for pain and swelling  -Incentive Spirometry use  -PT/OT to help with ambulation  -Plan for DC home today.  Follow up with Dr. Del Cole as scheduled  -Please page DO ortho with any questions    Sathya Clinton DO, PGY-5  7:57 AM 1/28/2020

## 2020-01-28 NOTE — PROGRESS NOTES
Physical Therapy  Facility/Department: [facility]  Daily Treatment Note  NAME: Eliceo Jacobo  : 1955  MRN: [id number]    Date of Service: 2020    Discharge Recommendations:  Home with assist PRN, Home with Home health PT        Assessment   Body structures, Functions, Activity limitations: Decreased functional mobility ; Decreased strength;Decreased endurance;Decreased ROM; Decreased balance  Assessment: pt able to negotiate 10 stairs with bilateral railings safely   Activity Tolerance  Activity Tolerance: Patient Tolerated treatment well     Patient Diagnosis(es): The encounter diagnosis was Status post total hip replacement, left.     has a past medical history of A-fib ([address] Abnormal EKG, Arrhythmia, Arthritis, CAD (coronary artery disease), Cardiomyopathy ([address] Cardiomyopathy, nonischemic ([address] CHF (congestive heart failure) ([address] COPD (chronic obstructive pulmonary disease) ([address] Diabetes mellitus ([address] Disturbance of sleep, Hyperlipidemia, Hypertension, Left atrial enlargement, Mild pulmonary hypertension ([address] Obesity, YOLY (obstructive sleep apnea), and Rheumatoid arthritis ([address] has a past surgical history that includes Colonoscopy; fracture surgery; Cardiac surgery ( & ); Cardioversion (, , ); Cardiac catheterization (10/2004, 2014); Total hip arthroplasty (Left, 2020); and Total hip arthroplasty (Left, 2020).     Restrictions  Restrictions/Precautions  Restrictions/Precautions: General Precautions, Fall Risk, Weight Bearing, Up as Tolerated  Required Braces or Orthoses?: No  Lower Extremity Weight Bearing Restrictions  Left Lower Extremity Weight Bearing: Weight Bearing As Tolerated  Position Activity Restriction  Other position/activity restrictions: anterior MASON by Dr. Haleigh Leahy  Chart Reviewed: Yes  Family / Caregiver Present: No  Subjective  Subjective: pt agreeable to PT  Pain Screening  Patient Currently in Pain: Yes  Pain Assessment  Pain Assessment: 0-10  Pain Level: 2  Pain Type: Surgical pain  Pain Location: Hip  Pain Orientation: Left  Vital Signs  Patient Currently in Pain: Yes       Orientation     Cognition      Objective   Bed mobility  Scooting: Contact guard assistance  Transfers  Sit to Stand: Contact guard assistance  Stand to sit: Contact guard assistance  Stand Pivot Transfers: Contact guard assistance  Ambulation  Ambulation?: Yes  Ambulation 1  Surface: level tile  Device: Rolling Walker  Assistance: Contact guard assistance  Quality of Gait: steady gait verbal cues for sequencing  Gait Deviations: Decreased step length;Decreased step height  Distance: 100 ft x 2  Comments: up to chair  Stairs/Curb  Stairs?: Yes  Stairs  # Steps : 10  Stairs Height: 6\"(8\")  Rails: Bilateral     Balance  Posture: Good  Sitting - Static: Good  Sitting - Dynamic: Good  Standing - Static: Good;-  Exercises  Comments: MASON ex per protocol x 10 reps         Comment: assisted pt donning support stockings              G-Code     OutComes Score                                                     AM-PAC Score  AM-PAC Inpatient Mobility Raw Score : 21 (01/28/20 1517)  AM-PAC Inpatient T-Scale Score : 50.25 (01/28/20 1517)  Mobility Inpatient CMS 0-100% Score: 28.97 (01/28/20 1517)  Mobility Inpatient CMS G-Code Modifier : CJ (01/28/20 1517)          Goals  Short term goals  Time Frame for Short term goals: 6 visits:  Short term goal 1: Pt. to be indep with bed mob, using sheet as leg  for LLE as needed  Short term goal 2: Pt. to be indep with sit to stand tranfser with RW  Short term goal 3: Pt. to amb. 200ft. with RW, indep.    Short term goal 4: Pt. to safely negotiate 6 steps to prepare for entering his home as discharge  Short term goal 5: Pt. to be indep with HEP and knowing ant. hip prec/ no SLR and how this applies functionally  Patient Goals   Patient goals : ambulate; home tomorrow    Plan    Plan  Times per week: twice daily/ 7 days/wk  Specific instructions for Next Treatment: gait/ stairs  Current Treatment Recommendations: Strengthening, ROM, Balance Training, Functional Mobility Training, Transfer Training, Endurance Training, Gait Training, Stair training, Patient/Caregiver Education & Training, Safety Education & Training, Home Exercise Program  Safety Devices  Type of devices:  All fall risk precautions in place, Call light within reach, Gait belt, Patient at risk for falls, Left in bed, Nurse notified, Bed alarm in place     Therapy Time   Individual Concurrent Group Co-treatment   Time In 1431         Time Out 1500         Minutes 29                 ANEL HOLDER, PTA

## 2020-01-28 NOTE — PROGRESS NOTES
position/activity restrictions: anterior MASON by Dr. Yael Soares  Patient assessed for rehabilitation services?: Yes  Pain Screening  Patient Currently in Pain: Yes          Orientation     Social/Functional History  Social/Functional History  Lives With: Son(15 y.o. son. )  Type of Home: House  Home Layout: One level(basement )  Home Access: Stairs to enter with rails  Entrance Stairs - Number of Steps: 5-6  Entrance Stairs - Rails: Both  Bathroom Shower/Tub: Walk-in shower(walk in tub)  Bathroom Toilet: Standard  Bathroom Equipment: Grab bars in shower, Grab bars around toilet, Toilet raiser  ADL Assistance: Independent  Homemaking Assistance: Independent  Ambulation Assistance: Independent  Transfer Assistance: Independent  Occupation: Retired  Cognition        Objective   Therex.:   Review of AP/isometrics/heel slides. Good demo. Encouraged pt. To do these as able when resting in bed. Ambulation  Ambulation?: Yes  Ambulation 1  Surface: level tile  Device: Rolling Walker  Assistance: Contact guard assistance  Quality of Gait: Pt. has a L lateral trunk lean that he was able to correct when pointed out to pt. Pt. feels he has some weakness due to pain with amb. prior to surgery. Some possible numbness from nerve block yesterday as pt. mildly buckled (caught self on RW) and still does not have pain, only achiness. Distance: 90ft.    Comments: up to chair     Balance  Posture: Good  Sitting - Static: Good  Sitting - Dynamic: Good  Standing - Static: Good;-(RW)        Plan   Plan  Times per week: twice daily/ 7 days/wk  Specific instructions for Next Treatment: gait/ stairs  Current Treatment Recommendations: Strengthening, ROM, Balance Training, Functional Mobility Training, Transfer Training, Endurance Training, Gait Training, Stair training, Patient/Caregiver Education & Training, Safety Education & Training, Home Exercise Program  Safety

## 2020-01-28 NOTE — PROGRESS NOTES
Status: Exceptions  Arousal/Alertness: Appropriate responses to stimuli  Following Commands:  Follows one step commands with repetition  Attention Span: Appears intact  Memory: Appears intact  Safety Judgement: Decreased awareness of need for safety  Problem Solving: Assistance required to correct errors made  Insights: Decreased awareness of deficits  Initiation: Requires cues for some  Sequencing: Requires cues for some  Cognition Comment: Pt focused on medication questions and req'd redirection to ask RN     Perception  Overall Perceptual Status: WFL                 LUE AROM (degrees)  LUE AROM : WFL  RUE AROM (degrees)  RUE AROM : WFL                 Plan   Plan  Times per week: 5x/week, 1-2x/day  Current Treatment Recommendations: Balance Training, Functional Mobility Training, Endurance Training, Equipment Evaluation, Education, & procurement, Patient/Caregiver Education & Training, Self-Care / ADL, Safety Education & Training           AM-PAC Score        AM-PAC Inpatient Daily Activity Raw Score: 20 (01/28/20 1151)  AM-PAC Inpatient ADL T-Scale Score : 42.03 (01/28/20 1151)  ADL Inpatient CMS 0-100% Score: 38.32 (01/28/20 1151)  ADL Inpatient CMS G-Code Modifier : Amie Bamberger (01/28/20 1151)    Goals  Short term goals  Time Frame for Short term goals: by discharge, pt will  Short term goal 1: demo S/MI with ADL transfers and functional mob for safe ADL completion   Short term goal 2: demo S/MI with toileting routine  Short term goal 3: demo I with UB ADLs and min A LB ADLs with DME/AE as needed and good safety  Short term goal 4: demo and verb good understanding of fall prevention techs, EC/WS techs, and possible equip needs for home   Patient Goals   Patient goals : to go home       Therapy Time   Individual Concurrent Group Co-treatment   Time In 1119         Time Out 1149         Minutes OUMAR Ashton

## 2020-01-28 NOTE — PROGRESS NOTES
mometasone-formoterol  2 puff Inhalation BID    gabapentin  100 mg Oral 5x Daily    lisinopril  5 mg Oral Daily    repaglinide  0.5 mg Oral TID AC    rosuvastatin  5 mg Oral Daily    sodium chloride flush  10 mL Intravenous 2 times per day    apixaban  2.5 mg Oral BID    celecoxib  200 mg Oral BID    acetaminophen  1,000 mg Oral Q6H    insulin lispro  0-6 Units Subcutaneous TID WC    insulin lispro  0-3 Units Subcutaneous Nightly     Continuous Infusions:    dextrose       PRN Meds: sodium chloride flush, magnesium hydroxide, traMADol, ondansetron **OR** ondansetron, oxyCODONE **OR** oxyCODONE, glucose, dextrose, glucagon (rDNA), dextrose    Data:     Past Medical History:   has a past medical history of A-fib ([facility] [address] Abnormal EKG, Arrhythmia, Arthritis, CAD (coronary artery disease), Cardiomyopathy ([facility] [address] Cardiomyopathy, nonischemic ([facility] [address] CHF (congestive heart failure) ([facility] [address] COPD (chronic obstructive pulmonary disease) ([facility] [address] Diabetes mellitus ([facility] [address] Disturbance of sleep, Hyperlipidemia, Hypertension, Left atrial enlargement, Mild pulmonary hypertension ([facility] [address] Obesity, YOLY (obstructive sleep apnea), and Rheumatoid arthritis ([facility] [address] Social History:   reports that he quit smoking about 12 years ago. He has a 24.00 pack-year smoking history. He has never used smokeless tobacco. He reports current alcohol use. He reports that he does not use drugs. Family History:   Family History   Problem Relation Age of Onset    Diabetes Mother     Heart Disease Mother        Vitals:  /62   Pulse 77   Temp 97.9 °F (36.6 °C) (Oral)   Resp 18   Ht 5' 10\" (1.778 m)   Wt 196 lb 13.9 oz (89.3 kg)   SpO2 96%   BMI 28.25 kg/m²   Temp (24hrs), Av.7 °F (36.5 °C), Min:96.8 °F (36 °C), Max:98.4 °F (36.9 °C)    Recent Labs     20  165208 20  0603 20  1107   POCGLU 296* 264* 207* 271*       I/O (24Hr):     Intake/Output Summary (Last 24 hours) at 2020 1120  Last data filed at 1/28/2020 0840  Gross per 24 hour   Intake 2231 ml   Output 1900 ml   Net 331 ml       Labs:  Hematology:  Recent Labs     01/28/20  0600   WBC 19.8*   RBC 3.03*   HGB 9.1*   HCT 28.4*   MCV 93.7   MCH 30.0   MCHC 32.0   RDW 12.3      MPV 9.3     Chemistry:  Recent Labs     01/28/20  0600   *   K 4.6   CL 97*   CO2 26   GLUCOSE 229*   BUN 22   CREATININE 0.92   ANIONGAP 9   LABGLOM >60   GFRAA >60   CALCIUM 8.8     Recent Labs     01/27/20  0651 01/27/20  1029 01/27/20  1652 01/27/20  2038 01/28/20  0603 01/28/20  1107   POCGLU 132* 165* 296* 264* 207* 271*     ABG:No results found for: POCPH, PHART, PH, POCPCO2, TWB6XOC, PCO2, POCPO2, PO2ART, PO2, POCHCO3, UOI5XHI, HCO3, NBEA, PBEA, BEART, BE, THGBART, THB, ORE3EUK, AOPW2NWI, B1CFIZWA, O2SAT, FIO2  No results found for: SPECIAL  No results found for: CULTURE    Radiology:  Xr Hip Left (2-3 Views)    Result Date: 1/27/2020  Intraprocedural fluoroscopic spot images as above. See separate procedure report for more information. Xr Hip 2-3 Vw W Pelvis Left    Result Date: 1/27/2020  Total hip arthropasty without acute hardware complication.        Physical Examination:        General appearance:  alert, cooperative and no distress  Mental Status:  oriented to person, place and time and normal affect  Lungs:  clear to auscultation bilaterally, normal effort  Heart:  regular rate and rhythm, no murmur  Abdomen:  soft, nontender, nondistended, normal bowel sounds, no masses, hepatomegaly, splenomegaly  Extremities:  no edema, redness, tenderness in the calves  Skin:  no gross lesions, rashes, induration    Assessment:        Hospital Problems           Last Modified POA    * (Principal) Status post total hip replacement, left 1/27/2020 Yes    COPD without exacerbation ([address] 1/27/2020 Yes    Cardiomyopathy, nonischemic ([address] 1/27/2020 Yes    Rheumatoid arthritis ([address] 1/27/2020 Yes    Type 2 diabetes mellitus without complication, without long-term

## 2020-01-28 NOTE — PROGRESS NOTES
Occupational Therapy   Occupational Therapy Re-Assessment  Date: 2020   Patient Name: Tor Riedel  MRN: [id number]     : 1955    Date of Service: 2020    Discharge Recommendations:  Home with assist PRN     RN reports patient is medically stable for therapy treatment this date. Chart reviewed prior to treatment and patient is agreeable for therapy. All lines intact and patient positioned comfortably at end of treatment. All patient needs addressed prior to ending therapy session. Assessment   Performance deficits / Impairments: Decreased functional mobility ; Decreased ADL status; Decreased strength;Decreased safe awareness;Decreased balance;Decreased endurance  Prognosis: Good  OT Education: OT Role;Energy Conservation;Plan of Care;Home Exercise Program;Precautions; Equipment;Transfer Training;ADL Adaptive Strategies;IADL Safety  REQUIRES OT FOLLOW UP: Yes  Activity Tolerance  Activity Tolerance: Patient Tolerated treatment well(limited by numbness)  Safety Devices  Safety Devices in place: Yes  Type of devices: Call light within reach;Nurse notified;Gait belt;Patient at risk for falls; Bed alarm in place; All fall risk precautions in place; Left in bed           Patient Diagnosis(es): The encounter diagnosis was Status post total hip replacement, left.     has a past medical history of A-fib ([address] Abnormal EKG, Arrhythmia, Arthritis, CAD (coronary artery disease), Cardiomyopathy ([address] Cardiomyopathy, nonischemic ([address] CHF (congestive heart failure) ([address] COPD (chronic obstructive pulmonary disease) ([address] Diabetes mellitus ([address] Disturbance of sleep, Hyperlipidemia, Hypertension, Left atrial enlargement, Mild pulmonary hypertension ([address] Obesity, YOLY (obstructive sleep apnea), and Rheumatoid arthritis ([address] has a past surgical history that includes Colonoscopy; fracture surgery; Cardiac surgery ( & ); Cardioversion (, , );  Cardiac catheterization (10/2004, goals : to go home       Therapy Time   Individual Concurrent Group Co-treatment   Time In 0830         Time Out 0902         Minutes Ruselma [address], [place]

## 2020-01-28 NOTE — CARE COORDINATION
Kathy from [facility] informed of D/C today.   Office will pull FELICIA from [employer]

## 2020-01-28 NOTE — PROGRESS NOTES
CLINICAL PHARMACY NOTE: MEDS TO [address] Select Patient?: No  Total # of Prescriptions Filled: 2   The following medications were delivered to the patient:  · PERCOCET 5-325  · ELIQUIS 2.5MG  Total # of Interventions Completed: 1  Time Spent (min): 60    Additional Documentation:  PT BOUGHT OTC BOTTLES OF STOOL SOFTENER 100MG AND SENNA 8.6MG

## 2020-01-30 LAB
ABO/RH: NORMAL
ANTIBODY IDENTIFICATION: NORMAL
ANTIBODY SCREEN: POSITIVE
ARM BAND NUMBER: NORMAL
BLD PROD TYP BPU: NORMAL
BLD PROD TYP BPU: NORMAL
BLOOD BANK COMMENT: NORMAL
CROSSMATCH RESULT: NORMAL
CROSSMATCH RESULT: NORMAL
DISPENSE STATUS BLOOD BANK: NORMAL
DISPENSE STATUS BLOOD BANK: NORMAL
EXPIRATION DATE: NORMAL
TRANSFUSION STATUS: NORMAL
TRANSFUSION STATUS: NORMAL
UNIT DIVISION: 0
UNIT DIVISION: 0
UNIT NUMBER: NORMAL
UNIT NUMBER: NORMAL

## 2020-02-20 ENCOUNTER — OFFICE VISIT (OUTPATIENT)
Dept: ORTHOPEDIC SURGERY | Age: 65
End: 2020-02-20

## 2020-02-20 VITALS — BODY MASS INDEX: 28.18 KG/M2 | WEIGHT: 196.87 LBS | HEIGHT: 70 IN

## 2020-02-20 PROCEDURE — 99024 POSTOP FOLLOW-UP VISIT: CPT | Performed by: PHYSICIAN ASSISTANT

## 2020-02-20 ASSESSMENT — ENCOUNTER SYMPTOMS
COUGH: 0
CONSTIPATION: 0
DIARRHEA: 0
NAUSEA: 0

## 2020-02-20 NOTE — PROGRESS NOTES
[facility]  [facility]  [id number]  Dept: [phone]  Dept Fax: [phone]        Postoperative follow-up note    Subjective:   Ana Esteves is a 71y.o. year old male who presents to our office today for postoperative followup regarding his   1. Arthritis of left hip    2. Status post total replacement of left hip    . Chief Complaint   Patient presents with   [address]     DOS:1/27/2020-left MASON     Ana Esteves  is a 71y.o. year old male who presents to our office today for postoperative follow up after having undergone a left total hip arthroplasty on 01/27/2020. The patient denies fevers, chills, nausea, vomiting, diarrhea. The patient has started physical therapy. He said home therapy has came to his house three times and taught him a HEP that he continues to do daily. Patient says his left hip feels uncomfortable when he lays on his right side and has his left leg crossed over. Otherwise patient states he has minimal hip pain and he is not using a cane or walker for support with ambulation. Patient states he is happy that he had the surgery done. Review of Systems   Constitutional: Negative for chills and fever. Respiratory: Negative for cough. Gastrointestinal: Negative for constipation, diarrhea and nausea. Musculoskeletal: Positive for arthralgias (left hip). Negative for gait problem, joint swelling and myalgias. Neurological: Negative for dizziness, weakness and numbness. I have reviewed the CC, HPI, ROS, PMH, FHX, Social History, and if not present in this note, I have reviewed in the patient's chart. I agree with the documentation provided by other staff and have reviewed their documentation prior to providing my signature indicating agreement. Objective :   General: Ana Esteves is a 71 y.o. male who is alert and oriented and sitting comfortably in our office.   Ortho Exam  MS:  Patient ambulates independently with a mild limp to the left lower extremity. Left hip incision healing well with no signs of infection. Negative hip log roll. Full AROM of  left hip, knee and ankle. Motor, sensory and vascular examination of the left lower extremity is grossly intact without focal deficits. Neuro: alert. oriented  Eyes: Extra-ocular muscles intact  Mouth: Oral mucosa moist. No perioral lesions  Pulm: Respirations unlabored and regular. Skin: warm, well perfused  Psych:  Patient has good fund of knowledge and displays understanging of exam, diagnosis, and plan. Radiology:     Assessment:      1. Arthritis of left hip    2. Status post total replacement of left hip         Plan:      Patient to continue with his home exercise program daily. Patient can shower but no soaking in a bath tub, hot tub, or swimming pool. Patient was instructed to keep the incision clean and dry and open to air. The patient is interested in transitioning to OTC pain medications to manage his post operative pain. The patient will follow-up in 4 weeks. The patient was instructed to call the office with any questions or concerns. Follow up: Return in about 4 weeks (around 3/19/2020) for re- evaluation. No orders of the defined types were placed in this encounter. No orders of the defined types were placed in this encounter. Cameron Jack RN am scribing for and in the presence of Abimbola Nassar PA-C and Dr. Kevin Escobar  2/20/2020 2:18 PM      I have reviewed and made changes accordingly to the work scribed by Courtney Resendiz RN. The documentation accurately reflects work and decisions made by me. I have also reviewed documentation completed by clinical staff.         Electronically signed by Ernst Nogueira PA-C on 2/20/2020 at 9:17 PM

## 2020-04-16 ENCOUNTER — TELEMEDICINE (OUTPATIENT)
Dept: FAMILY MEDICINE CLINIC | Age: 65
End: 2020-04-16
Payer: MEDICARE

## 2020-04-16 VITALS — WEIGHT: 196 LBS | RESPIRATION RATE: 16 BRPM | HEIGHT: 70 IN | BODY MASS INDEX: 28.06 KG/M2

## 2020-04-16 PROCEDURE — 1123F ACP DISCUSS/DSCN MKR DOCD: CPT | Performed by: INTERNAL MEDICINE

## 2020-04-16 PROCEDURE — 4040F PNEUMOC VAC/ADMIN/RCVD: CPT | Performed by: INTERNAL MEDICINE

## 2020-04-16 PROCEDURE — 99213 OFFICE O/P EST LOW 20 MIN: CPT | Performed by: INTERNAL MEDICINE

## 2020-04-16 PROCEDURE — G8427 DOCREV CUR MEDS BY ELIG CLIN: HCPCS | Performed by: INTERNAL MEDICINE

## 2020-04-16 PROCEDURE — 3017F COLORECTAL CA SCREEN DOC REV: CPT | Performed by: INTERNAL MEDICINE

## 2020-04-16 PROCEDURE — G8417 CALC BMI ABV UP PARAM F/U: HCPCS | Performed by: INTERNAL MEDICINE

## 2020-04-16 PROCEDURE — 1036F TOBACCO NON-USER: CPT | Performed by: INTERNAL MEDICINE

## 2020-04-16 ASSESSMENT — PATIENT HEALTH QUESTIONNAIRE - PHQ9
1. LITTLE INTEREST OR PLEASURE IN DOING THINGS: 0
2. FEELING DOWN, DEPRESSED OR HOPELESS: 0
SUM OF ALL RESPONSES TO PHQ QUESTIONS 1-9: 0
SUM OF ALL RESPONSES TO PHQ9 QUESTIONS 1 & 2: 0
SUM OF ALL RESPONSES TO PHQ QUESTIONS 1-9: 0

## 2020-04-17 ASSESSMENT — ENCOUNTER SYMPTOMS
DIARRHEA: 0
ABDOMINAL PAIN: 0
COLOR CHANGE: 0
BACK PAIN: 0
CONSTIPATION: 0

## 2020-04-17 NOTE — PROGRESS NOTES
Visit Information    Have you changed or started any medications since your last visit including any over-the-counter medicines, vitamins, or herbal medicines? no   Are you having any side effects from any of your medications? -  no  Have you stopped taking any of your medications? Is so, why? -  no    Have you seen any other physician or provider since your last visit? No  Have you had any other diagnostic tests since your last visit? No  Have you been seen in the emergency room and/or had an admission to a hospital since we last saw you? No  Have you had your routine dental cleaning in the past 6 months? yes   Have you activated your [employer] account? If not, what are your barriers?  No:      Patient Care Team:  Teetee Gallardo DO as PCP - General (Family Medicine)  Teetee Galladro DO as PCP - [facility]    Medical History Review  Past Medical, Family, and Social History reviewed and does contribute to the patient presenting condition    Health Maintenance   Topic Date Due    AAA screen  1955    Hepatitis C screen  1955    Diabetic retinal exam  06/16/1964    HIV screen  06/16/1969    Shingles Vaccine (1 of 2) 06/16/2004    Colon cancer screen colonoscopy  01/01/2016    Diabetic microalbuminuria test  04/19/2017    Diabetic foot exam  11/29/2017    DTaP/Tdap/Td vaccine (1 - Tdap) 10/17/2020 (Originally 6/16/1973)    Annual Wellness Visit (AWV)  10/17/2020    Pneumococcal 65+ years Vaccine (2 of 2 - PPSV23) 10/17/2020    A1C test (Diabetic or Prediabetic)  11/13/2020    Lipid screen  11/13/2020    Potassium monitoring  01/28/2021    Creatinine monitoring  01/28/2021    Flu vaccine  Completed    Hepatitis A vaccine  Aged Out    Hib vaccine  Aged Out    Meningococcal (ACWY) vaccine  Aged Out
Coronavirus Preparedness and Response Supplemental Appropriations Act, this Virtual Visit was conducted with patient's (and/or legal guardian's) consent, to reduce the patient's risk of exposure to COVID-19 and provide necessary medical care. The patient (and/or legal guardian) has also been advised to contact this office for worsening conditions or problems, and seek emergency medical treatment and/or call 911 if deemed necessary. Services were provided through a video synchronous discussion virtually to substitute for in-person clinic visit. Patient and provider were located at their individual homes. --Dimas Anthony DO on 4/17/2020 at 10:40 AM    An electronic signature was used to authenticate this note.

## 2020-05-06 ENCOUNTER — TELEPHONE (OUTPATIENT)
Dept: FAMILY MEDICINE CLINIC | Age: 65
End: 2020-05-06

## 2020-09-14 ENCOUNTER — OFFICE VISIT (OUTPATIENT)
Dept: FAMILY MEDICINE CLINIC | Age: 65
End: 2020-09-14
Payer: MEDICARE

## 2020-09-14 VITALS
HEIGHT: 70 IN | OXYGEN SATURATION: 98 % | SYSTOLIC BLOOD PRESSURE: 138 MMHG | DIASTOLIC BLOOD PRESSURE: 84 MMHG | HEART RATE: 97 BPM | WEIGHT: 211 LBS | RESPIRATION RATE: 18 BRPM | BODY MASS INDEX: 30.21 KG/M2

## 2020-09-14 PROCEDURE — 1123F ACP DISCUSS/DSCN MKR DOCD: CPT | Performed by: INTERNAL MEDICINE

## 2020-09-14 PROCEDURE — 90653 IIV ADJUVANT VACCINE IM: CPT | Performed by: INTERNAL MEDICINE

## 2020-09-14 PROCEDURE — G8417 CALC BMI ABV UP PARAM F/U: HCPCS | Performed by: INTERNAL MEDICINE

## 2020-09-14 PROCEDURE — G8427 DOCREV CUR MEDS BY ELIG CLIN: HCPCS | Performed by: INTERNAL MEDICINE

## 2020-09-14 PROCEDURE — 99213 OFFICE O/P EST LOW 20 MIN: CPT | Performed by: INTERNAL MEDICINE

## 2020-09-14 PROCEDURE — G0008 ADMIN INFLUENZA VIRUS VAC: HCPCS | Performed by: INTERNAL MEDICINE

## 2020-09-14 PROCEDURE — 4040F PNEUMOC VAC/ADMIN/RCVD: CPT | Performed by: INTERNAL MEDICINE

## 2020-09-14 PROCEDURE — 3017F COLORECTAL CA SCREEN DOC REV: CPT | Performed by: INTERNAL MEDICINE

## 2020-09-14 PROCEDURE — 1036F TOBACCO NON-USER: CPT | Performed by: INTERNAL MEDICINE

## 2020-09-14 ASSESSMENT — ENCOUNTER SYMPTOMS
SHORTNESS OF BREATH: 1
COLOR CHANGE: 0
DIARRHEA: 0
CONSTIPATION: 0
CHEST TIGHTNESS: 0
STRIDOR: 0
CHOKING: 0
COUGH: 0
ABDOMINAL PAIN: 0

## 2020-09-14 NOTE — PROGRESS NOTES
[address] [facility]  [address]  [facility] [address] [address]  Dept: [phone]  Dept Fax: [phone]    Campbell Galaviz a 76 y.o. male who presents today for his medical conditions/complaints as notedbelow.   Flavioma El is c/o of   Chief Complaint   Patient presents with    6 Month Follow-Up    Arthritis    Flu Vaccine       HPI:     Here for f/u   Methotrexate did work at first but does not seem to as well any more  Gets stiffness and pain mostly at night  Gets some numbness and tingling also ; used to be on gabapentin more for his feet but lost a lot of weight and that helped so does nto want to restart    Due for cscope   Wants to think about cologuard  Sees endo for his DM so up to date on DM maintainence     No other new issues or concerns         Hemoglobin A1C (%)   Date Value   11/13/2019 6.6 (H)   07/05/2012 7.6 (H)   04/11/2012 7.1 (H)         ( goal A1C is < 7)   No results found for: LABMICR  LDL Cholesterol (mg/dL)   Date Value   11/13/2019 106   04/11/2012 105 (H)       (goal LDL is <100)   AST (U/L)   Date Value   01/15/2020 14     ALT (U/L)   Date Value   01/15/2020 15     BUN (mg/dL)   Date Value   01/28/2020 22     BP Readings from Last 3 Encounters:   09/14/20 138/84   01/28/20 108/63   01/27/20 (!) 99/56          (goal 120/80)    Past Medical History:   Diagnosis Date    A-fib ([address]     Abnormal EKG     Arrhythmia     Arthritis     CAD (coronary artery disease)     Cardiomyopathy    Cardiomyopathy ([address]     per medical record    Cardiomyopathy, nonischemic ([address]     CHF (congestive heart failure) ([address] 2013    COPD (chronic obstructive pulmonary disease) ([address]     On Inhalers;doesn't use routinely    Diabetes mellitus ([address]     Disturbance of sleep     Hyperlipidemia     Hypertension     Left atrial enlargement     per medical record    Mild pulmonary hypertension ([address]     per medical record    Obesity     YOLY (obstructive sleep apnea)     per medical record. Pt denies. Pt does not wear a CPAP or BiPAP.  Rheumatoid arthritis [facility])       Past Surgical History:   Procedure Laterality Date    CARDIAC CATHETERIZATION  10/2004, 2014    No stents per pt. [address]   &     Ablation    CARDIOVERSION  , ,     COLONOSCOPY      FRACTURE SURGERY      No surgery;just casted    TOTAL HIP ARTHROPLASTY Left 2020    TOTAL HIP ARTHROPLASTY Left 2020    LEFT HIP TOTAL ARTHROPLASTY ANTERIOR APPROACH    MEDACTA performed by Martine Cervantes DO at [address] [place] [address]       Family History   Problem Relation Age of Onset    Diabetes Mother     Heart Disease Mother        Social History     Tobacco Use    Smoking status: Former Smoker     Packs/day: 1.00     Years: 24.00     Pack years: 24.00     Last attempt to quit: 2008     Years since quittin.7    Smokeless tobacco: Never Used   Substance Use Topics    Alcohol use: Yes     Alcohol/week: 0.0 standard drinks     Comment: 5 glasses of wine/weekly      Current Outpatient Medications   Medication Sig Dispense Refill    methotrexate (RHEUMATREX) 7.5 MG chemo tablet Take 1 tablet by mouth Twice a Week 180 tablet 5    methotrexate (RHEUMATREX) 2.5 MG chemo tablet Take 3 tablets by mouth every 7 days 90 tablet 0    rosuvastatin (CRESTOR) 5 MG tablet Take 5 mg by mouth daily      Dulaglutide (TRULICITY) 1.5 HX/0.5IC SOPN Inject 1.5 mg into the skin once a week Saturday      sildenafil (VIAGRA) 100 MG tablet Take 1 tablet by mouth as needed for Erectile Dysfunction 30 tablet 3    nateglinide (STARLIX) 60 MG tablet Take 60 mg by mouth 3 times daily (before meals)       lisinopril (PRINIVIL;ZESTRIL) 5 MG tablet Take 5 mg by mouth daily       budesonide-formoterol (SYMBICORT) 160-4.5 MCG/ACT AERO Inhale 2 puffs into the lungs 2 times daily (Patient not taking: Reported on 2020) 3 Inhaler 1    Misc.  Devices MISC Rolling  Walker 1 range of motion, no tenderness, no bony tenderness, no swelling, no edema, no deformity, no pain, no spasm and normal pulse. Right hand: He exhibits decreased range of motion, tenderness and bony tenderness. Left hand: He exhibits decreased range of motion, tenderness and bony tenderness. Normal sensation noted. Right lower leg: No edema. Left lower leg: No edema. Lymphadenopathy:      Cervical: No cervical adenopathy. Skin:     General: Skin is warm and dry. Capillary Refill: Capillary refill takes less than 2 seconds. Findings: No rash. Neurological:      General: No focal deficit present. Mental Status: He is alert and oriented to person, place, and time. Cranial Nerves: Cranial nerves are intact. No cranial nerve deficit. Sensory: No sensory deficit. Motor: Motor function is intact. No weakness, atrophy or abnormal muscle tone. Coordination: Coordination is intact. Coordination normal.      Gait: Gait abnormal.   Psychiatric:         Mood and Affect: Mood normal.         Behavior: Behavior normal.         Thought Content: Thought content normal.         Judgment: Judgment normal.       /84   Pulse 97   Resp 18   Ht 5' 10\" (1.778 m)   Wt 211 lb (95.7 kg)   SpO2 98%   BMI 30.28 kg/m²     Assessment:       Diagnosis Orders   1. Osteoarthritis of multiple joints, unspecified osteoarthritis type     2. Paroxysmal atrial fibrillation (HCC)     3. Rheumatoid arthritis involving both hands, unspecified rheumatoid factor presence ([facility]     4. Flu vaccine need               Plan:       Return in about 6 months (around 3/14/2021), or if symptoms worsen or fail to improve, for arthritis . No orders of the defined types were placed in this encounter.     Orders Placed This Encounter   Medications    methotrexate (RHEUMATREX) 7.5 MG chemo tablet     Sig: Take 1 tablet by mouth Twice a Week     Dispense:  180 tablet     Refill:  5    adjusted/dobule dose   Check labs   Can go up to three times weekly if needed    Flu shot in office . Patientgiven educational materials - see patient instructions. Discussed use, benefit,and side effects of prescribed medications. All patient questions answered. Ptvoiced understanding. Reviewed health maintenance. Instructed to continue currentmedications, diet and exercise. Patient agreed with treatment plan. Follow up asdirected.      Electronically signed by Ary Santiago DO on 9/14/2020 at 8:57 AM

## 2020-10-01 ENCOUNTER — HOSPITAL ENCOUNTER (OUTPATIENT)
Age: 65
Setting detail: SPECIMEN
Discharge: HOME | End: 2020-10-01
Payer: MEDICARE

## 2020-10-01 LAB
ALBUMIN SERPL-MCNC: 4.5 G/DL (ref 3.5–5.2)
ALBUMIN/GLOBULIN RATIO: 1.3 (ref 1–2.5)
ALP BLD-CCNC: 78 U/L (ref 40–129)
ALT SERPL-CCNC: 20 U/L (ref 5–41)
ANION GAP SERPL CALCULATED.3IONS-SCNC: 15 MMOL/L (ref 9–17)
AST SERPL-CCNC: 24 U/L
BILIRUB SERPL-MCNC: 0.64 MG/DL (ref 0.3–1.2)
BUN BLDV-MCNC: 16 MG/DL (ref 8–23)
BUN/CREAT BLD: ABNORMAL (ref 9–20)
CALCIUM SERPL-MCNC: 9.7 MG/DL (ref 8.6–10.4)
CHLORIDE BLD-SCNC: 91 MMOL/L (ref 98–107)
CHOLESTEROL/HDL RATIO: 2.6
CHOLESTEROL: 199 MG/DL
CO2: 25 MMOL/L (ref 20–31)
CREAT SERPL-MCNC: 0.97 MG/DL (ref 0.7–1.2)
CREATININE URINE: 245.2 MG/DL (ref 39–259)
GFR AFRICAN AMERICAN: >60 ML/MIN
GFR NON-AFRICAN AMERICAN: >60 ML/MIN
GFR SERPL CREATININE-BSD FRML MDRD: ABNORMAL ML/MIN/{1.73_M2}
GFR SERPL CREATININE-BSD FRML MDRD: ABNORMAL ML/MIN/{1.73_M2}
GLUCOSE BLD-MCNC: 211 MG/DL (ref 70–99)
HDLC SERPL-MCNC: 77 MG/DL
LDL CHOLESTEROL: 104 MG/DL (ref 0–130)
MICROALBUMIN/CREAT 24H UR: 13 MG/L
MICROALBUMIN/CREAT UR-RTO: 5 MCG/MG CREAT
POTASSIUM SERPL-SCNC: 4.8 MMOL/L (ref 3.7–5.3)
SODIUM BLD-SCNC: 131 MMOL/L (ref 135–144)
TOTAL PROTEIN: 8 G/DL (ref 6.4–8.3)
TRIGL SERPL-MCNC: 88 MG/DL
VLDLC SERPL CALC-MCNC: NORMAL MG/DL (ref 1–30)

## 2020-10-07 ENCOUNTER — TELEPHONE (OUTPATIENT)
Dept: FAMILY MEDICINE CLINIC | Age: 65
End: 2020-10-07

## 2020-10-07 NOTE — TELEPHONE ENCOUNTER
Pt stated that the methotrexate was denied.  Pt stated that you need to send in methotrexate 2.5 mg take 3 tablets twice a week and dispense 72 pills for a 3 month supply

## 2020-10-27 ENCOUNTER — NURSE ONLY (OUTPATIENT)
Dept: FAMILY MEDICINE CLINIC | Age: 65
End: 2020-10-27
Payer: MEDICARE

## 2020-10-27 PROCEDURE — G0009 ADMIN PNEUMOCOCCAL VACCINE: HCPCS | Performed by: INTERNAL MEDICINE

## 2020-10-27 PROCEDURE — 90732 PPSV23 VACC 2 YRS+ SUBQ/IM: CPT | Performed by: INTERNAL MEDICINE

## 2020-12-29 ENCOUNTER — OFFICE VISIT (OUTPATIENT)
Dept: FAMILY MEDICINE CLINIC | Age: 65
End: 2020-12-29
Payer: MEDICARE

## 2020-12-29 VITALS
DIASTOLIC BLOOD PRESSURE: 66 MMHG | HEART RATE: 87 BPM | HEIGHT: 70 IN | WEIGHT: 212 LBS | SYSTOLIC BLOOD PRESSURE: 104 MMHG | OXYGEN SATURATION: 95 % | TEMPERATURE: 97 F | BODY MASS INDEX: 30.35 KG/M2

## 2020-12-29 DIAGNOSIS — M15.9 OSTEOARTHRITIS OF MULTIPLE JOINTS, UNSPECIFIED OSTEOARTHRITIS TYPE: ICD-10-CM

## 2020-12-29 DIAGNOSIS — I48.0 PAROXYSMAL ATRIAL FIBRILLATION (HCC): Primary | ICD-10-CM

## 2020-12-29 DIAGNOSIS — E11.9 TYPE 2 DIABETES MELLITUS WITHOUT COMPLICATION, WITHOUT LONG-TERM CURRENT USE OF INSULIN (HCC): ICD-10-CM

## 2020-12-29 DIAGNOSIS — Z13.1 SCREENING FOR DIABETES MELLITUS: ICD-10-CM

## 2020-12-29 DIAGNOSIS — Z12.5 SCREENING FOR PROSTATE CANCER: ICD-10-CM

## 2020-12-29 DIAGNOSIS — S61.419A: ICD-10-CM

## 2020-12-29 PROCEDURE — G8427 DOCREV CUR MEDS BY ELIG CLIN: HCPCS | Performed by: INTERNAL MEDICINE

## 2020-12-29 PROCEDURE — 1123F ACP DISCUSS/DSCN MKR DOCD: CPT | Performed by: INTERNAL MEDICINE

## 2020-12-29 PROCEDURE — G8482 FLU IMMUNIZE ORDER/ADMIN: HCPCS | Performed by: INTERNAL MEDICINE

## 2020-12-29 PROCEDURE — 2022F DILAT RTA XM EVC RTNOPTHY: CPT | Performed by: INTERNAL MEDICINE

## 2020-12-29 PROCEDURE — 1036F TOBACCO NON-USER: CPT | Performed by: INTERNAL MEDICINE

## 2020-12-29 PROCEDURE — 3017F COLORECTAL CA SCREEN DOC REV: CPT | Performed by: INTERNAL MEDICINE

## 2020-12-29 PROCEDURE — 3044F HG A1C LEVEL LT 7.0%: CPT | Performed by: INTERNAL MEDICINE

## 2020-12-29 PROCEDURE — 99214 OFFICE O/P EST MOD 30 MIN: CPT | Performed by: INTERNAL MEDICINE

## 2020-12-29 PROCEDURE — G8417 CALC BMI ABV UP PARAM F/U: HCPCS | Performed by: INTERNAL MEDICINE

## 2020-12-29 PROCEDURE — 4040F PNEUMOC VAC/ADMIN/RCVD: CPT | Performed by: INTERNAL MEDICINE

## 2020-12-29 SDOH — ECONOMIC STABILITY: INCOME INSECURITY: HOW HARD IS IT FOR YOU TO PAY FOR THE VERY BASICS LIKE FOOD, HOUSING, MEDICAL CARE, AND HEATING?: NOT HARD AT ALL

## 2020-12-29 NOTE — PROGRESS NOTES
Visit Information    Have you changed or started any medications since your last visit including any over-the-counter medicines, vitamins, or herbal medicines? no   Are you having any side effects from any of your medications? -  no  Have you stopped taking any of your medications? Is so, why? -  no    Have you seen any other physician or provider since your last visit? No  Have you had any other diagnostic tests since your last visit? No  Have you been seen in the emergency room and/or had an admission to a hospital since we last saw you? No  Have you had your routine dental cleaning in the past 6 months? no    Have you activated your [employer] account? If not, what are your barriers?  Yes     Patient Care Team:  Toney Kayser, DO as PCP - General (Family Medicine)  Toney Kayser, DO as PCP - [facility] Provider    Medical History Review  Past Medical, Family, and Social History reviewed and does contribute to the patient presenting condition    Health Maintenance   Topic Date Due    Diabetic retinal exam  06/16/1964    DTaP/Tdap/Td vaccine (1 - Tdap) 06/16/1973    Shingles Vaccine (1 of 2) 06/16/2004    Colon cancer screen colonoscopy  01/01/2016    Diabetic foot exam  11/29/2017    Annual Wellness Visit (AWV)  11/19/2019    A1C test (Diabetic or Prediabetic)  11/13/2020    AAA screen  09/14/2021 (Originally 1955)    Diabetic microalbuminuria test  10/01/2021    Lipid screen  10/01/2021    Potassium monitoring  10/01/2021    Creatinine monitoring  10/01/2021    Flu vaccine  Completed    Pneumococcal 65+ years Vaccine  Completed    Hepatitis A vaccine  Aged Out    Hib vaccine  Aged Out    Meningococcal (ACWY) vaccine  Aged Out    Hepatitis C screen  Discontinued

## 2020-12-31 ENCOUNTER — HOSPITAL ENCOUNTER (OUTPATIENT)
Age: 65
Setting detail: SPECIMEN
Discharge: HOME | End: 2020-12-31
Payer: MEDICARE

## 2020-12-31 DIAGNOSIS — E11.9 TYPE 2 DIABETES MELLITUS WITHOUT COMPLICATION, WITHOUT LONG-TERM CURRENT USE OF INSULIN (HCC): ICD-10-CM

## 2020-12-31 DIAGNOSIS — Z12.5 SCREENING FOR PROSTATE CANCER: ICD-10-CM

## 2020-12-31 LAB
ESTIMATED AVERAGE GLUCOSE: 146 MG/DL
HBA1C MFR BLD: 6.7 % (ref 4–6)
PROSTATE SPECIFIC ANTIGEN: 7.39 UG/L
SODIUM BLD-SCNC: 137 MMOL/L (ref 135–144)

## 2021-01-02 ASSESSMENT — ENCOUNTER SYMPTOMS
DIFFICULTY BREATHING: 0
ORTHOPNEA: 0
SORE THROAT: 0
STRIDOR: 0
SHORTNESS OF BREATH: 0
HOARSE VOICE: 0
RECTAL PAIN: 0
ANAL BLEEDING: 0
FREQUENT THROAT CLEARING: 1
BLOOD IN STOOL: 0
SWOLLEN GLANDS: 0
CHEST TIGHTNESS: 0
VOMITING: 0
COUGH: 0
SPUTUM PRODUCTION: 1
NAUSEA: 0
DIARRHEA: 0
HEMOPTYSIS: 0
RHINORRHEA: 0
CONSTIPATION: 0
TROUBLE SWALLOWING: 0
ABDOMINAL PAIN: 0
ABDOMINAL DISTENTION: 0
WHEEZING: 1
CHOKING: 0

## 2021-01-02 ASSESSMENT — COPD QUESTIONNAIRES: COPD: 1

## 2021-01-02 NOTE — PROGRESS NOTES
[address] [facility]  [address] [address]  Dept: [phone]  Dept Fax: [phone]    Nadja Galaviz a 76 y.o. male who presents today for his medical conditions/complaints as notedbelow. Yaneth Jose Manuel is c/o of   Chief Complaint   Patient presents with    COPD     Would like info on prostate screening. Patient is also complaining of SOB and weakness when going up an down stairs.  Thinks he may need another ablation        HPI:     Patient here for multiple complaints   Has had some sob recently in the last week to two   [place] it might be his copd but also worried that it could be his heart going back out of rhythm again   He thinks he might need an ablation again   Has hx of paroxysmal afib and has had ablation x 2 once in the 90s and then again early this year early 2020 when ekg prior to hip replacement showed afib   He is currently not on any anti coag or rate /rhythm control as they ablation was apparently successful per patient and there is no up dated notes in our system ie progress notes from cardio   He has not followed up with them   No real chest pain or sob  Does describe some cough and sputum mainly first thing in the AM and at night   No f/c   Some more fatigue , juan josé with activity     He also would like prostate checked   He is vague if he has any symptoms   No constipation or diarrhea  Does not get up a lot to go to the bathroom at night   No blood in urine   No family hx of prostate cancer     States he is due for a1c but follows with endo for this   Has had dm exam in the last year , ie foot exam   Refusing cologuard     Needs refill on arthritis medication   Working very well         COPD 10/01/2020 16     BP Readings from Last 3 Encounters:   20 104/66   20 138/84   20 108/63          (goal 120/80)    Past Medical History:   Diagnosis Date    [facility]fib [facility]     Abnormal EKG     Arrhythmia     Arthritis     CAD (coronary artery disease)     Cardiomyopathy    Cardiomyopathy ([facility]     per medical record    Cardiomyopathy, nonischemic ([facility]     CHF (congestive heart failure) ([facility]     COPD (chronic obstructive [facility] disease) [facility]     On Inhalers;doesn't use routinely    Diabetes mellitus (HCC)     Disturbance of sleep     Hyperlipidemia     Hypertension     Left atrial enlargement     per medical record    Mild pulmonary hypertension ([facility]     per medical record    Obesity     YOLY (obstructive sleep apnea)     per medical record. Pt denies. Pt does not wear a CPAP or BiPAP.  Rheumatoid arthritis [facility])       Past Surgical History:   Procedure Laterality Date    CARDIAC CATHETERIZATION  10/2004, 2014    No stents per pt. [address]   &     Ablation    CARDIOVERSION  , ,     COLONOSCOPY      FRACTURE SURGERY      No surgery;just casted    TOTAL HIP ARTHROPLASTY Left 2020    TOTAL HIP ARTHROPLASTY Left 2020    LEFT HIP TOTAL ARTHROPLASTY ANTERIOR APPROACH    MEDACTA performed by Emiliana Schmidt DO [facility]       Family History   Problem Relation Age of Onset    Diabetes Mother     Heart Disease Mother        Social History     Tobacco Use    Smoking status: Former Smoker     Packs/day: 1.00     Years: 24.00     Pack years: 24.00     Quit date: 2008     Years since quittin.0    Smokeless tobacco: Never Used   Substance Use Topics    Alcohol use:  Yes     Alcohol/week: 0.0 standard drinks     Comment: 5 glasses of wine/weekly      Current Outpatient Medications   Medication Sig Dispense Refill    methotrexate (RHEUMATREX) 2.5 MG chemo tablet Take 3 tablets twice weekly 72 tablet 5  rosuvastatin (CRESTOR) 5 MG tablet Take 5 mg by mouth daily      Dulaglutide (TRULICITY) 1.5 SY/6.8GO SOPN Inject 1.5 mg into the skin once a week Saturday      budesonide-formoterol (SYMBICORT) 160-4.5 MCG/ACT AERO Inhale 2 puffs into the lungs 2 times daily (Patient taking differently: Inhale 2 puffs into the lungs as needed ) 3 Inhaler 1    Misc. Devices MISC Rolling  Walker 1 Device 0    Misc. Devices MISC Shower Bench 1 Device 0    Misc. Devices MISC Bedside Commode 1 Device 0    sildenafil (VIAGRA) 100 MG tablet Take 1 tablet by mouth as needed for Erectile Dysfunction 30 tablet 3    nateglinide (STARLIX) 60 MG tablet Take 60 mg by mouth 3 times daily (before meals)       gabapentin (NEURONTIN) 100 MG capsule Take 100 mg by mouth 5 times daily.  lisinopril (PRINIVIL;ZESTRIL) 5 MG tablet Take 5 mg by mouth daily        No current facility-administered medications for this visit.       Allergies   Allergen Reactions    Fentanyl Shortness Of Breath    Cherry     Ezetimibe-Simvastatin           Health Maintenance   Topic Date Due    Diabetic retinal exam  06/16/1964    Shingles Vaccine (1 of 2) 06/16/2004    Colon cancer screen colonoscopy  01/01/2016    Diabetic foot exam  11/29/2017    AAA screen  09/14/2021 (Originally 1955)    Annual Wellness Visit (AWV)  12/29/2022 (Originally 11/19/2019)    Diabetic microalbuminuria test  10/01/2021    Lipid screen  10/01/2021    Potassium monitoring  10/01/2021    Creatinine monitoring  10/01/2021    A1C test (Diabetic or Prediabetic)  12/31/2021    PSA counseling  12/31/2021    DTaP/Tdap/Td vaccine (2 - Td) 12/29/2030    Flu vaccine  Completed    Pneumococcal 65+ years Vaccine  Completed    Hepatitis A vaccine  Aged Out    Hib vaccine  Aged Out    Meningococcal (ACWY) vaccine  Aged Out    Hepatitis C screen  Discontinued       Subjective:     Review of Systems Trachea: Trachea and phonation normal.   Cardiovascular:      Rate and Rhythm: Normal rate. Rhythm irregularly irregular. No extrasystoles are present. Pulses: Normal pulses. Heart sounds: Normal heart sounds, S1 normal and S2 normal. Heart sounds not distant. No murmur. Pulmonary:      Effort: Pulmonary effort is normal. No bradypnea, accessory muscle usage, prolonged expiration, respiratory distress or retractions. Breath sounds: Normal breath sounds and air entry. No stridor, decreased air movement or transmitted upper airway sounds. No decreased breath sounds, wheezing, rhonchi or rales. Abdominal:      Palpations: There is no hepatomegaly or splenomegaly. Genitourinary:     Prostate: Enlarged. Not tender and no nodules present. Musculoskeletal:      Right shoulder: He exhibits no tenderness, no bony tenderness, no pain, no spasm, normal pulse and normal strength. Left knee: He exhibits normal range of motion, no swelling, no effusion, no ecchymosis, no deformity, no laceration and no erythema. No tenderness found. Lumbar back: He exhibits spasm. He exhibits normal range of motion, no tenderness, no bony tenderness, no swelling, no edema, no deformity, no pain and normal pulse. Right lower leg: No edema. Left lower leg: No edema. Lymphadenopathy:      Cervical: No cervical adenopathy. Skin:     General: Skin is warm and dry. Findings: Bruising present. No rash. Neurological:      General: No focal deficit present. Mental Status: He is alert and oriented to person, place, and time. Cranial Nerves: Cranial nerves are intact. No cranial nerve deficit. Sensory: No sensory deficit. Motor: Motor function is intact. No atrophy or abnormal muscle tone. Coordination: Coordination is intact. Psychiatric:         Mood and Affect: Mood normal.         Behavior: Behavior normal.         Thought Content:  Thought content normal. Judgment: Judgment normal.       /66 (Site: Right Upper Arm, Position: Sitting, Cuff Size: Medium Adult)   Pulse 87   Temp 97 °F (36.1 °C) (Temporal)   Ht 5' 10\" (1.778 m)   Wt 212 lb (96.2 kg)   SpO2 95%   BMI 30.42 kg/m²     Assessment:       Diagnosis Orders   1. Paroxysmal atrial fibrillation (HCC)  JAVI Leos MD, Cardiology, [place]   2. Screening for diabetes mellitus     3. Screening for prostate cancer  Psa screening   4. Osteoarthritis of multiple joints, unspecified osteoarthritis type  methotrexate (RHEUMATREX) 2.5 MG chemo tablet   5. Type 2 diabetes mellitus without complication, without long-term current use of insulin (HCC)  Hemoglobin A1C   6. Cut of hand, initial encounter  Tetanus-Diphth-Acell Pertussis (BOOSTRIX) injection 0.5 mL             Plan:       No follow-ups on file. Orders Placed This Encounter   Procedures    Psa screening     Standing Status:   Future     Number of Occurrences:   1     Standing Expiration Date:   12/29/2021    Hemoglobin A1C     Standing Status:   Future     Number of Occurrences:   1     Standing Expiration Date:   12/29/2021    JAVI Leos MD, Cardiology, [place]     Referral Priority:   Routine     Referral Type:   Eval and Treat     Referral Reason:   Specialty Services Required     Referred to Provider:   Jolanta Sal MD     Requested Specialty:   Cardiology     Number of Visits Requested:   1     Orders Placed This Encounter   Medications    methotrexate (RHEUMATREX) 2.5 MG chemo tablet     Sig: Take 3 tablets twice weekly     Dispense:  72 tablet     Refill:  5    DISCONTD: Tetanus-Diphth-Acell Pertussis (BOOSTRIX) injection 0.5 mL    Tetanus-Diphth-Acell Pertussis (BOOSTRIX) injection 0.5 mL    refilled methotrexate     Pt does appear to be irregular in rhythm   Will re-refer to cardiology , to get ASAP  If sxs worsen or develops cp or palpitations go to ED.  Pt verbalized understanding   Vitals stable Seek immediate medical attention for any chest pain , severe trouble breathing and/or visual changes. Add aspirin 325 mg for now   Check psa lab     Up date tetanus as well with recent cut to hand        Patientgiven educational materials - see patient instructions. Discussed use, benefit,and side effects of prescribed medications. All patient questions answered. Ptvoiced understanding. Reviewed health maintenance. Instructed to continue currentmedications, diet and exercise. Patient agreed with treatment plan. Follow up asdirected.      Electronically signed by Franco Miller DO on 1/2/2021 at 5:09 PM

## 2021-01-03 DIAGNOSIS — N40.1 BENIGN PROSTATIC HYPERPLASIA WITH LOWER URINARY TRACT SYMPTOMS, SYMPTOM DETAILS UNSPECIFIED: ICD-10-CM

## 2021-01-03 DIAGNOSIS — R97.20 ELEVATED PSA: Primary | ICD-10-CM

## 2021-01-20 PROBLEM — R97.20 ELEVATED PSA: Status: ACTIVE | Noted: 2021-01-20

## 2021-01-20 PROBLEM — N52.8 OTHER MALE ERECTILE DYSFUNCTION: Status: ACTIVE | Noted: 2021-01-20

## 2021-03-17 ENCOUNTER — HOSPITAL ENCOUNTER (OUTPATIENT)
Age: 66
Setting detail: SPECIMEN
Discharge: HOME | End: 2021-03-17
Payer: MEDICARE

## 2021-03-17 PROBLEM — N40.0 BPH WITHOUT OBSTRUCTION/LOWER URINARY TRACT SYMPTOMS: Status: ACTIVE | Noted: 2021-03-17

## 2021-03-19 LAB — SURGICAL PATHOLOGY REPORT: NORMAL

## 2021-04-14 DIAGNOSIS — M15.9 OSTEOARTHRITIS OF MULTIPLE JOINTS, UNSPECIFIED OSTEOARTHRITIS TYPE: ICD-10-CM

## 2021-07-23 ENCOUNTER — TELEPHONE (OUTPATIENT)
Dept: FAMILY MEDICINE CLINIC | Age: 66
End: 2021-07-23

## 2021-07-23 NOTE — TELEPHONE ENCOUNTER
----- Message from Shashank Cardenas sent at 7/23/2021  9:24 AM EDT -----  Subject: Referral Request    QUESTIONS   Reason for referral request? would like a referral request for his hip. Has the physician seen you for this condition before? No   Preferred Specialist (if applicable)? Do you already have an appointment scheduled? No  Additional Information for Provider? Patient states he needs to be seen   urgently for a hip pain  ---------------------------------------------------------------------------  --------------  CALL BACK INFO  What is the best way for the office to contact you? OK to leave message on   voicemail  Preferred Call Back Phone Number?  [phone]

## 2021-07-27 ENCOUNTER — OFFICE VISIT (OUTPATIENT)
Dept: FAMILY MEDICINE CLINIC | Age: 66
End: 2021-07-27
Payer: MEDICARE

## 2021-07-27 VITALS
BODY MASS INDEX: 28.49 KG/M2 | DIASTOLIC BLOOD PRESSURE: 84 MMHG | HEART RATE: 83 BPM | WEIGHT: 199 LBS | HEIGHT: 70 IN | SYSTOLIC BLOOD PRESSURE: 142 MMHG | OXYGEN SATURATION: 97 % | RESPIRATION RATE: 18 BRPM

## 2021-07-27 DIAGNOSIS — Z13.29 THYROID DISORDER SCREEN: ICD-10-CM

## 2021-07-27 DIAGNOSIS — Z12.5 PROSTATE CANCER SCREENING: ICD-10-CM

## 2021-07-27 DIAGNOSIS — E11.9 TYPE 2 DIABETES MELLITUS WITHOUT COMPLICATION, WITHOUT LONG-TERM CURRENT USE OF INSULIN (HCC): ICD-10-CM

## 2021-07-27 DIAGNOSIS — I10 ESSENTIAL HYPERTENSION: ICD-10-CM

## 2021-07-27 DIAGNOSIS — G89.29 CHRONIC RIGHT HIP PAIN: Primary | ICD-10-CM

## 2021-07-27 DIAGNOSIS — Z13.220 LIPID SCREENING: ICD-10-CM

## 2021-07-27 DIAGNOSIS — M25.551 CHRONIC RIGHT HIP PAIN: Primary | ICD-10-CM

## 2021-07-27 PROCEDURE — 4040F PNEUMOC VAC/ADMIN/RCVD: CPT | Performed by: NURSE PRACTITIONER

## 2021-07-27 PROCEDURE — G8417 CALC BMI ABV UP PARAM F/U: HCPCS | Performed by: NURSE PRACTITIONER

## 2021-07-27 PROCEDURE — 3017F COLORECTAL CA SCREEN DOC REV: CPT | Performed by: NURSE PRACTITIONER

## 2021-07-27 PROCEDURE — 99213 OFFICE O/P EST LOW 20 MIN: CPT | Performed by: NURSE PRACTITIONER

## 2021-07-27 PROCEDURE — 2022F DILAT RTA XM EVC RTNOPTHY: CPT | Performed by: NURSE PRACTITIONER

## 2021-07-27 PROCEDURE — 3046F HEMOGLOBIN A1C LEVEL >9.0%: CPT | Performed by: NURSE PRACTITIONER

## 2021-07-27 PROCEDURE — 1123F ACP DISCUSS/DSCN MKR DOCD: CPT | Performed by: NURSE PRACTITIONER

## 2021-07-27 PROCEDURE — G8427 DOCREV CUR MEDS BY ELIG CLIN: HCPCS | Performed by: NURSE PRACTITIONER

## 2021-07-27 PROCEDURE — 1036F TOBACCO NON-USER: CPT | Performed by: NURSE PRACTITIONER

## 2021-07-27 ASSESSMENT — ENCOUNTER SYMPTOMS
SHORTNESS OF BREATH: 0
DIARRHEA: 0
SINUS PAIN: 0
BACK PAIN: 0
COUGH: 0
VOMITING: 0
SORE THROAT: 0
ABDOMINAL PAIN: 0
NAUSEA: 0
EYE PAIN: 0

## 2021-07-27 NOTE — PROGRESS NOTES
[address] [facility]  [address]  [address] [facility] [address] [address]  Dept: [phone]  Dept Fax: [phone]    García Holloway is a 78 y.o. male who presents today for his medicalconditions/complaints as noted below. García Holloway is c/o of Established New Doctor and Referral - General (for hip replacement want to see someone that works out of [facility])      HPI:         41-year-old male patient presents with complaints of encounter to establish care. Significant history of chronic right hip pain. Reports that he has dealt with this for several years gradually worsened in the past several months. Denies any acute injury or trauma. Reports the right hip pain worsens with ambulation and prolonged standing. Reports he develops a limp at the end of the day. Reports he had similar symptoms prior to his left hip being replaced. Reportedly saw orthopedic specialist Dr. Corbin Walton, does not want to travel to Hospitals in [place]. History of type 2 diabetes follows with endocrinology currently takes Trulicity 3 mg, Starlix 60 3 times daily. History of CAD, hypertension, cardiomyopathy, CHF. Follows with [facility] cardiology. Currently takes amiodarone, lisinopril. Blood pressure slightly elevated today. History of rheumatoid arthritis takes methotrexate    History of COPD reports mild well-controlled does not use any inhalers.       Past Medical History:   Diagnosis Date    A-fib ([address]     Abnormal EKG     Arrhythmia     Arthritis     CAD (coronary artery disease)     Cardiomyopathy    Caffeine use     2 cups coffee per day    Cardiomyopathy ([address]     per medical record    Cardiomyopathy, nonischemic ([address]     CHF (congestive heart failure) ([facility] 2013    COPD (chronic obstructive pulmonary disease) (HCC)     On Inhalers;doesn't use routinely    Diabetes mellitus (HCC)     Disturbance of sleep     ED (erectile dysfunction)     Hyperlipidemia  Hypertension     Left atrial enlargement     per medical record    Mild pulmonary hypertension ([address]     per medical record    Obesity     YOLY (obstructive sleep apnea)     per medical record. Pt denies. Pt does not wear a CPAP or BiPAP.  Rheumatoid arthritis (HCC)         Current Outpatient Medications   Medication Sig Dispense Refill    methotrexate (RHEUMATREX) 2.5 MG chemo tablet Take 3 tablets twice weekly 72 tablet 5    amiodarone (CORDARONE) 200 MG tablet Take 400 mg by mouth daily      Dulaglutide (TRULICITY) 1.5 UU/9.0DP SOPN Inject 3 mg into the skin once a week Saturday       nateglinide (STARLIX) 60 MG tablet Take 60 mg by mouth 3 times daily (before meals)       lisinopril (PRINIVIL;ZESTRIL) 5 MG tablet Take 5 mg by mouth daily        No current facility-administered medications for this visit. Allergies   Allergen Reactions    Fentanyl Shortness Of Breath    Cherry     Ezetimibe-Simvastatin        Subjective:      Review of Systems   Constitutional: Negative for chills and fatigue. HENT: Negative for congestion, ear pain, sinus pain and sore throat. Eyes: Negative for pain and visual disturbance. Respiratory: Negative for cough and shortness of breath. Cardiovascular: Negative for chest pain and palpitations. Gastrointestinal: Negative for abdominal pain, diarrhea, nausea and vomiting. Genitourinary: Negative for penile pain and testicular pain. Musculoskeletal: Positive for arthralgias (rt hip). Negative for back pain, joint swelling and neck pain. Skin: Negative for rash. Neurological: Negative for dizziness and light-headedness. Hematological: Does not bruise/bleed easily. All other systems reviewed and are negative.      :Objective     Physical Exam  Vitals and nursing note reviewed. Constitutional:       General: He is not in acute distress. Appearance: Normal appearance. He is not toxic-appearing.    HENT:      Mouth/Throat:      Mouth: Mucous membranes are moist.   Cardiovascular:      Rate and Rhythm: Normal rate. Pulmonary:      Effort: Pulmonary effort is normal.      Breath sounds: Normal breath sounds. Musculoskeletal:      Right hip: Deformity and bony tenderness present. No tenderness. Skin:     General: Skin is warm and dry. Neurological:      General: No focal deficit present. Mental Status: He is alert and oriented to person, place, and time. BP (!) 142/84   Pulse 83   Resp 18   Ht 5' 10\" (1.778 m)   Wt 199 lb (90.3 kg)   SpO2 97%   BMI 28.55 kg/m²     Lab Review   Hospital Outpatient Visit on 03/17/2021   Component Date Value    Surgical Pathology Report 03/17/2021                      Value:-- Diagnosis --  1. PROSTATE, LLB, NEEDLE CORE BIOPSY:  BENIGN PROSTATE TISSUE. 2.  PROSTATE, LMB, NEEDLE CORE BIOPSY:  BENIGN PROSTATE TISSUE. 3.  PROSTATE, RMB, NEEDLE CORE BIOPSY:  BENIGN PROSTATE TISSUE. 4.  PROSTATE, RLB, NEEDLE CORE BIOPSY:  BENIGN PROSTATE TISSUE. 5.  PROSTATE, LLM, NEEDLE CORE BIOPSY:  BENIGN PROSTATE TISSUE. 6.  PROSTATE, L MID, NEEDLE CORE BIOPSY:  BENIGN PROSTATE TISSUE.    7.  PROSTATE, R MID, NEEDLE CORE BIOPSY:  BENIGN PROSTATE TISSUE. 8.  PROSTATE, RLM, NEEDLE CORE BIOPSY:  BENIGN PROSTATE TISSUE. 9.  PROSTATE, LLM, NEEDLE CORE BIOPSY:  BENIGN PROSTATE TISSUE. 10.  PROSTATE, L MID, NEEDLE CORE BIOPSY:  BENIGN PROSTATE TISSUE. 11.  PROSTATE, R MID, NEEDLE CORE BIOPSY:  BENIGN PROSTATE TISSUE. 12.  PROSTATE, RLM, NEEDLE CORE BIOPSY:  BENIGN PROSTATE TISSUE. 13.  PROSTATE, L APEX, NEEDLE CORE BIOPSY:  BENIGN PROSTATE TISSUE. 14.  PROSTATE, R APEX, NEEDLE CORE BIOPSY:  BENIGN PROSTATE TISSUE.           SARAHI Luong                            **Electronically Signed Out**         ajb/3/19/2021       Clinical Information  Pre-Op Diagnosis:   ELEVATED PSA  Operative Findings:  BIOPSIES LLB, LMB, RMB, RLB, LLM, L MID, R MID,  RLM, LLM, L MID, R MID, RLM, L APEX, R APEX  Operation Performed:  PROSTATE BIOPSIES    Source of Specimen  1: LLB  2: LMB  3: RMB  4: RLB  5: LLM  6: L MID  7: R MID  8: RLM  9: LLM  10: L MID  11: R MID  12: RLM  13: L APEX  14: R APEX    Gross Description  \"DANIELLE CHEN, PROSTATE BIOPSIES\"  All specimens are needle core  biopsies with the following dimensions. 1. \"LLB\"  One core 1.5 cm. in length. Entirely. 1cs  2. \"LMB\" One core 1.6 cm. in length. Entirely. 1cs  3. \"RMB\" One core 1.6 cm. in length. Entirely. 1cs  4. \"RLB\" One core 1.7 cm. in length. Entirely. 1cs  5. \"LLM\" One core 1.8 cm. in length. Entirely. 1cs  6. \"L MID\" One core 1.7 cm. in length. Entirely. 1cs  7. \"R MID\" One core 1.6 cm. in length. Entirely. 1cs  8. \"RLM\" One core 1.8 cm. in length. Entirely. 1cs                            9.  \"LLM\" One core 1.9 cm. in length. Entirely. 1cs  10. \"L MID\" One core 1.6 cm. in length. Entirely. 1cs  11. \"R MID\" One core 1.6 cm. in length. Entirely. 1cs  12. \"RLM\" One core 1.7 cm. in length. Entirely. 1cs  13. \"L APEX\" One core 1.5 cm. in length. Entirely. 1cs  14. \"R APEX\" One core 1.3 cm. in length. Entirely. 1cs   rh/dw       Microscopic Description  1-12. Microscopic examination performed. SURGICAL PATHOLOGY CONSULTATION       Patient Name: Remberto Ground Med Rec: [id number]  Path Number: [id number]    [facility]  CONSULTING PATHOLOGISTS [facility]  ANATOMIC PATHOLOGY  [address] [place] [address]. [place], [address]  [phone]  Fax: [phone]     Procedure visit on 03/17/2021   Component Date Value    Color, UA 03/17/2021 yellow     Clarity, UA 03/17/2021 clear     Glucose, UA POC 03/17/2021 negative     Blood, UA POC 03/17/2021 negative     Protein, UA POC 03/17/2021 30 mg/dL     Leukocytes, UA 03/17/2021 negative     Nitrite, UA 03/17/2021 negative        Assessment and Plan      1. Chronic right hip pain  -     XR HIP 2-3 VW W PELVIS RIGHT;  Future  -     AFL - Kingsley Carreon MD, Orthopedic Surgery, [facility] With Auto Differential; Future  2. Essential hypertension  -     CBC With Auto Differential; Future  -     Comprehensive Metabolic Panel; Future  -     Lipid Panel; Future  3. Type 2 diabetes mellitus without complication, without long-term current use of insulin (HCC)  -     Comprehensive Metabolic Panel; Future  -     Hemoglobin A1C; Future  4. Prostate cancer screening  -     PSA Screening; Future  5. Lipid screening  -     Lipid Panel; Future  6. Thyroid disorder screen  -     TSH With Reflex Ft4; Future      Order fracture ray right hip, referral to orthopedist for consultation regarding hip replacement    Routine labs to include CBC, CMP, lipids, A1c, PSA, TSH    Recommend continued follow with endocrinology, cardiology    Follow-up in 6 months,  Sooner prn                 No results found for this visit on 07/27/21. Return in about 6 months (around 1/27/2022), or if symptoms worsen or fail to improve. No orders of the defined types were placed in this encounter. Patient given educational materials - see patient instructions. Discussed use, benefit, and side effects of prescribed medications. All patientquestions answered. Pt voiced understanding. Patient given educational materials - see patient instructions. Discussed use, benefit, and side effects of prescribed medications. All patientquestions answered. Pt voiced understanding. This note was transcribed using dictation with Dragon services. Efforts were made to correct any errors but some words may be misinterpreted.     Electronically signed by PIPER Phillips CNP on 7/27/2021at 3:06 PM

## 2021-07-27 NOTE — PATIENT INSTRUCTIONS
Patient Education        Hip Pain: Care Instructions  Your Care Instructions     Hip pain may be caused by many things, including overuse, a fall, or a twisting movement. Another cause of hip pain is arthritis. Your pain may increase when you stand up, walk, or squat. The pain may come and go or may be constant. Home treatment can help relieve hip pain, swelling, and stiffness. If your pain is ongoing, you may need more tests and treatment. Follow-up care is a key part of your treatment and safety. Be sure to make and go to all appointments, and call your doctor if you are having problems. It's also a good idea to know your test results and keep a list of the medicines you take. How can you care for yourself at home? · Take pain medicines exactly as directed. ? If the doctor gave you a prescription medicine for pain, take it as prescribed. ? If you are not taking a prescription pain medicine, ask your doctor if you can take an over-the-counter medicine. · Rest and protect your hip. Take a break from any activity, including standing or walking, that may cause pain. · Put ice or a cold pack against your hip for 10 to 20 minutes at a time. Try to do this every 1 to 2 hours for the next 3 days (when you are awake) or until the swelling goes down. Put a thin cloth between the ice and your skin. · Sleep on your healthy side with a pillow between your knees, or sleep on your back with pillows under your knees. · If there is no swelling, you can put moist heat, a heating pad, or a warm cloth on your hip. Do gentle stretching exercises to help keep your hip flexible. · Learn how to prevent falls. Have your vision and hearing checked regularly. Wear slippers or shoes with a nonskid sole. · Stay at a healthy weight. · Wear comfortable shoes. When should you call for help? Call 911 anytime you think you may need emergency care.  For example, call if:    · You have sudden chest pain and shortness of breath, or you cough up blood.     · You are not able to stand or walk or bear weight.     · Your buttocks, legs, or feet feel numb or tingly.     · Your leg or foot is cool or pale or changes color.     · You have severe pain. Call your doctor now or seek immediate medical care if:    · You have signs of infection, such as:  ? Increased pain, swelling, warmth, or redness in the hip area. ? Red streaks leading from the hip area. ? Pus draining from the hip area. ? A fever.     · You have signs of a blood clot, such as:  ? Pain in your calf, back of the knee, thigh, or groin. ? Redness and swelling in your leg or groin.     · You are not able to bend, straighten, or move your leg normally.     · You have trouble urinating or having bowel movements. Watch closely for changes in your health, and be sure to contact your doctor if:    · You do not get better as expected. Where can you learn more? Go to https://[employer]. org and sign in to your [employer] account. Enter J852 in the [employer] box to learn more about \"Hip Pain: Care Instructions. \"     If you do not have an account, please click on the \"Sign Up Now\" link. Current as of: October 19, 2020               Content Version: 12.9  © 5425-0308 Healthwise, Incorporated. Care instructions adapted under license by [facility] ([facility]). If you have questions about a medical condition or this instruction, always ask your healthcare professional. [place] [address] any warranty or liability for your use of this information.

## 2021-07-29 DIAGNOSIS — M25.551 CHRONIC RIGHT HIP PAIN: ICD-10-CM

## 2021-07-29 DIAGNOSIS — G89.29 CHRONIC RIGHT HIP PAIN: ICD-10-CM

## 2021-10-15 ENCOUNTER — HOSPITAL ENCOUNTER (OUTPATIENT)
Dept: PREADMISSION TESTING | Age: 66
Discharge: HOME | End: 2021-10-19
Payer: MEDICARE

## 2021-10-15 VITALS
WEIGHT: 194 LBS | RESPIRATION RATE: 16 BRPM | DIASTOLIC BLOOD PRESSURE: 80 MMHG | BODY MASS INDEX: 30.45 KG/M2 | OXYGEN SATURATION: 99 % | HEART RATE: 60 BPM | SYSTOLIC BLOOD PRESSURE: 153 MMHG | TEMPERATURE: 97.6 F | HEIGHT: 67 IN

## 2021-10-15 LAB
ANION GAP SERPL CALCULATED.3IONS-SCNC: 12 MMOL/L (ref 9–17)
BUN BLDV-MCNC: 16 MG/DL (ref 8–23)
BUN/CREAT BLD: 15 (ref 9–20)
CALCIUM SERPL-MCNC: 9.1 MG/DL (ref 8.6–10.4)
CHLORIDE BLD-SCNC: 95 MMOL/L (ref 98–107)
CO2: 26 MMOL/L (ref 20–31)
CREAT SERPL-MCNC: 1.09 MG/DL (ref 0.7–1.2)
ESTIMATED AVERAGE GLUCOSE: 117 MG/DL
GFR AFRICAN AMERICAN: >60 ML/MIN
GFR NON-AFRICAN AMERICAN: >60 ML/MIN
GFR SERPL CREATININE-BSD FRML MDRD: ABNORMAL ML/MIN/{1.73_M2}
GFR SERPL CREATININE-BSD FRML MDRD: ABNORMAL ML/MIN/{1.73_M2}
GLUCOSE BLD-MCNC: 117 MG/DL (ref 70–99)
HBA1C MFR BLD: 5.7 % (ref 4–6)
HCT VFR BLD CALC: 39.2 % (ref 40.7–50.3)
HEMOGLOBIN: 13 G/DL (ref 13–17)
MCH RBC QN AUTO: 35.1 PG (ref 25.2–33.5)
MCHC RBC AUTO-ENTMCNC: 33.2 G/DL (ref 28.4–34.8)
MCV RBC AUTO: 105.9 FL (ref 82.6–102.9)
NRBC AUTOMATED: 0 PER 100 WBC
PDW BLD-RTO: 13.7 % (ref 11.8–14.4)
PLATELET # BLD: 376 K/UL (ref 138–453)
PMV BLD AUTO: 9 FL (ref 8.1–13.5)
POTASSIUM SERPL-SCNC: 4.7 MMOL/L (ref 3.7–5.3)
RBC # BLD: 3.7 M/UL (ref 4.21–5.77)
SODIUM BLD-SCNC: 133 MMOL/L (ref 135–144)
WBC # BLD: 8 K/UL (ref 3.5–11.3)

## 2021-10-15 PROCEDURE — 85027 COMPLETE CBC AUTOMATED: CPT

## 2021-10-15 PROCEDURE — 83036 HEMOGLOBIN GLYCOSYLATED A1C: CPT

## 2021-10-15 PROCEDURE — 86901 BLOOD TYPING SEROLOGIC RH(D): CPT

## 2021-10-15 PROCEDURE — 86920 COMPATIBILITY TEST SPIN: CPT

## 2021-10-15 PROCEDURE — 93005 ELECTROCARDIOGRAM TRACING: CPT | Performed by: ANESTHESIOLOGY

## 2021-10-15 PROCEDURE — 80048 BASIC METABOLIC PNL TOTAL CA: CPT

## 2021-10-15 PROCEDURE — 86870 RBC ANTIBODY IDENTIFICATION: CPT

## 2021-10-15 PROCEDURE — 36415 COLL VENOUS BLD VENIPUNCTURE: CPT

## 2021-10-15 PROCEDURE — 86900 BLOOD TYPING SEROLOGIC ABO: CPT

## 2021-10-15 PROCEDURE — 86850 RBC ANTIBODY SCREEN: CPT

## 2021-10-15 PROCEDURE — 86902 BLOOD TYPE ANTIGEN DONOR EA: CPT

## 2021-10-15 PROCEDURE — 87641 MR-STAPH DNA AMP PROBE: CPT

## 2021-10-15 PROCEDURE — 86880 COOMBS TEST DIRECT: CPT

## 2021-10-15 RX ORDER — GABAPENTIN 300 MG/1
300 CAPSULE ORAL ONCE
Status: CANCELLED | OUTPATIENT
Start: 2021-10-27

## 2021-10-15 RX ORDER — ACETAMINOPHEN 500 MG
1000 TABLET ORAL ONCE
Status: CANCELLED | OUTPATIENT
Start: 2021-10-27

## 2021-10-15 RX ORDER — CELECOXIB 200 MG/1
200 CAPSULE ORAL ONCE
Status: CANCELLED | OUTPATIENT
Start: 2021-10-27

## 2021-10-15 ASSESSMENT — PAIN DESCRIPTION - ORIENTATION: ORIENTATION: RIGHT

## 2021-10-15 ASSESSMENT — HOOS JR
RISING FROM SITTING: 2
GOING UP OR DOWN STAIRS: 2
LYING IN BED (TURNING OVER, MAINTAINING HIP POSITION): 2
SITTING: 2
WALKING ON UNEVEN SURFACE: 1
BENDING TO THE FLOOR TO PICK UP OBJECT: 2
HOOS JR RAW SCORE: 11

## 2021-10-15 ASSESSMENT — PROMIS GLOBAL HEALTH SCALE
TO WHAT EXTENT ARE YOU ABLE TO CARRY OUT YOUR EVERYDAY PHYSICAL ACTIVITIES SUCH AS WALKING, CLIMBING STAIRS, CARRYING GROCERIES, OR MOVING A CHAIR [ON A SCALE OF 1 (NOT AT ALL) TO 5 (COMPLETELY)]?: 3
IN GENERAL, HOW WOULD YOU RATE YOUR PHYSICAL HEALTH [ON A SCALE OF 1 (POOR) TO 5 (EXCELLENT)]?: 3
IN GENERAL, PLEASE RATE HOW WELL YOU CARRY OUT YOUR USUAL SOCIAL ACTIVITIES (INCLUDES ACTIVITIES AT HOME, AT WORK, AND IN YOUR COMMUNITY, AND RESPONSIBILITIES AS A PARENT, CHILD, SPOUSE, EMPLOYEE, FRIEND, ETC) [ON A SCALE OF 1 (POOR) TO 5 (EXCELLENT)]?: 3
HOW IS THE PROMIS V1.1 BEING ADMINISTERED?: 0
IN THE PAST 7 DAYS, HOW WOULD YOU RATE YOUR PAIN ON AVERAGE [ON A SCALE FROM 0 (NO PAIN) TO 10 (WORST IMAGINABLE PAIN)]?: 6
WHO IS THE PERSON COMPLETING THE PROMIS V1.1 SURVEY?: 0
IN THE PAST 7 DAYS, HOW WOULD YOU RATE YOUR FATIGUE ON AVERAGE [ON A SCALE FROM 1 (NONE) TO 5 (VERY SEVERE)]?: 3
IN GENERAL, HOW WOULD YOU RATE YOUR SATISFACTION WITH YOUR SOCIAL ACTIVITIES AND RELATIONSHIPS [ON A SCALE OF 1 (POOR) TO 5 (EXCELLENT)]?: 3
SUM OF RESPONSES TO QUESTIONS 3, 6, 7, & 8: 15
IN GENERAL, WOULD YOU SAY YOUR HEALTH IS...[ON A SCALE OF 1 (POOR) TO 5 (EXCELLENT)]: 2
SUM OF RESPONSES TO QUESTIONS 2, 4, 5, & 10: 10
IN GENERAL, WOULD YOU SAY YOUR QUALITY OF LIFE IS...[ON A SCALE OF 1 (POOR) TO 5 (EXCELLENT)]: 2
IN GENERAL, HOW WOULD YOU RATE YOUR MENTAL HEALTH, INCLUDING YOUR MOOD AND YOUR ABILITY TO THINK [ON A SCALE OF 1 (POOR) TO 5 (EXCELLENT)]?: 3
IN THE PAST 7 DAYS, HOW OFTEN HAVE YOU BEEN BOTHERED BY EMOTIONAL PROBLEMS, SUCH AS FEELING ANXIOUS, DEPRESSED, OR IRRITABLE [ON A SCALE FROM 1 (NEVER) TO 5 (ALWAYS)]?: 2

## 2021-10-15 ASSESSMENT — PAIN - FUNCTIONAL ASSESSMENT: PAIN_FUNCTIONAL_ASSESSMENT: PREVENTS OR INTERFERES SOME ACTIVE ACTIVITIES AND ADLS

## 2021-10-15 ASSESSMENT — PAIN DESCRIPTION - DESCRIPTORS: DESCRIPTORS: ACHING

## 2021-10-15 ASSESSMENT — PAIN DESCRIPTION - LOCATION: LOCATION: HIP

## 2021-10-15 ASSESSMENT — PAIN DESCRIPTION - FREQUENCY: FREQUENCY: CONTINUOUS

## 2021-10-15 ASSESSMENT — PAIN DESCRIPTION - PAIN TYPE: TYPE: CHRONIC PAIN

## 2021-10-15 ASSESSMENT — PAIN SCALES - GENERAL: PAINLEVEL_OUTOF10: 5

## 2021-10-15 ASSESSMENT — PAIN DESCRIPTION - ONSET: ONSET: ON-GOING

## 2021-10-15 ASSESSMENT — PAIN DESCRIPTION - PROGRESSION: CLINICAL_PROGRESSION: GRADUALLY WORSENING

## 2021-10-15 NOTE — PRE-PROCEDURE INSTRUCTIONS
[address] [facility] ON Wednesday, October 27,2021 at 08:00 AM    Once you enter the hospital lobby, take the elevators to the second floor. Check-In is at the surgery registration desk. Continue to take your home medications as you normally do up to and including the night before surgery with the exception of any blood thinning medications. Please stop any blood thinning medications as directed by your surgeon or prescribing physician. Failure to stop certain medications may interfere with your scheduled surgery. These may include:  Aspirin, Warfarin (Coumadin), Clopidogrel (Plavix), Ibuprofen (Motrin, Advil), Naproxen (Aleve), Meloxicam (Mobic), Celecoxib (Celebrex), Eliquis, Pradaxa, Xarelto, Effient, Fish Oil, Herbal supplements. If you are diabetic, do not take any of your diabetic medications by mouth the morning of surgery. If you are taking insulin contact the doctor that manages your diabetes for instructions about any changes to your insulin dosages the day before surgery. Do not inject insulin or other injectable diabetic medications the morning of surgery unless otherwise instructed by the doctor who manages your diabetes. Please take the following medication(s) the day of surgery with a small sip of water:  Amiodarone,    PREPARING FOR YOUR SURGERY:     Before surgery, you can play an important role in your own health. Because skin is not sterile, we need to be sure that your skin is as free of germs as possible before surgery by carefully washing before surgery. Preparing or prepping skin before surgery can reduce the risk of a surgical site infection.   Do not shave the area of your body where your surgery will be performed unless you received specific permission from your physician. You will need to shower at home the night before surgery and the morning of surgery with a special soap called chlorhexidine gluconate (CHG*).      *Not to be used by people allergic to Chlorhexidine Gluconate (CHG). Following these instructions will help you be sure that your skin is clean before surgery. Instructions on cleaning your skin before surgery: The night before your surgery:      You will need to shower with warm water (not hot) and the CHG soap.  Use a clean wash cloth and a clean towel. Have clean clothes available to put on after the shower.   First wash your hair with regular shampoo. Rinse your hair and body thoroughly to remove the shampoo.  Wash your face and genital area (private parts) with your regular soap or water only. Thoroughly rinse your body with warm water from the neck down.  Turn water off to prevent rinsing the soap off too soon.  With a clean wet washcloth and half of the CHG soap in the bottle, lather your entire body from the neck down. Do not use CHG soap near your eyes or ears to avoid injury to those areas.  Wash thoroughly, paying special attention to the area where your surgery will be performed.  Wash your body gently for five (5) minutes. Avoid scrubbing your skin too hard.  Turn the water back on and rinse your body thoroughly.  Pat yourself dry with a clean, soft towel. Do not apply lotion, cream or powder.  Dress with clean freshly washed clothes. The morning of surgery:     Repeat shower following steps above - using remaining half of CHG soap in bottle. Patient Instructions:    Mikel Matias If you are having any type of anesthesia you are to have nothing to eat or drink after midnight the night before your surgery. This includes gum, hard candy, mints, water or smoking or chewing tobacco.  The only exception to this is a small sip of water to take with any morning dose of heart, blood pressure, or seizure medications. No alcoholic beverages for 24 hours prior to surgery.  Brush your teeth but do not swallow water.  Bring your eyeglasses and case with you.   No contacts are to be worn the day of surgery. You also may bring your hearing aids. Most surgical procedures involving anesthesia will require that you remove your dentures prior to surgery. · Do not wear any jewelry or body piercings day of surgery. Also, NO lotion, perfume or deodorant to be used the day of surgery. No nail polish on the operative extremity (arm/leg surgeries)    · If you are staying overnight with us, please bring a small bag of necessary personal items.  Please wear loose, comfortable clothing. If you are potentially going to have a cast or brace bring clothing that will fit over them.  In case of illness - If you have cold or flu like symptoms (high fever, runny nose, sore throat, cough, etc.) rash, nausea, vomiting, loose stools, and/or recent contact with someone who has a contagious disease (chicken pox, measles, etc.) Please call your doctor before coming to the hospital.         Day of Surgery/Procedure:    As a patient at [facility] - INPATIENT you can expect quality medical and nursing care that is centered on your individual needs. Our goal is to make your surgical experience as comfortable as possible    . Transportation After Your Surgery/Procedure: You will need a friend or family member to drive you home after your procedure. Your  must be 25years of age or older and able to sign off on your discharge instructions. A taxi cab or any other form of public transportation is not acceptable. Your friend or family member must stay at the hospital throughout your procedure. Someone must remain with you for the first 24 hours after your surgery if you receive anesthesia or medication. If you do not have someone to stay with you, your procedure may be cancelled.       If you have any other questions regarding your procedure or the day of surgery, please call [phone]      _________________________  ____________________________  Signature (Patient)              Signature (Provider) & date

## 2021-10-16 LAB
MRSA, DNA, NASAL: NORMAL
SPECIMEN DESCRIPTION: NORMAL

## 2021-10-18 ENCOUNTER — HOSPITAL ENCOUNTER (OUTPATIENT)
Age: 66
Discharge: HOME | End: 2021-10-18
Payer: MEDICARE

## 2021-10-18 ENCOUNTER — ANESTHESIA EVENT (OUTPATIENT)
Dept: OPERATING ROOM | Age: 66
End: 2021-10-18
Payer: MEDICARE

## 2021-10-18 DIAGNOSIS — I10 ESSENTIAL HYPERTENSION: ICD-10-CM

## 2021-10-18 DIAGNOSIS — Z13.29 THYROID DISORDER SCREEN: ICD-10-CM

## 2021-10-18 DIAGNOSIS — Z13.220 LIPID SCREENING: ICD-10-CM

## 2021-10-18 DIAGNOSIS — Z12.5 PROSTATE CANCER SCREENING: ICD-10-CM

## 2021-10-18 DIAGNOSIS — R71.8 ELEVATED MCV: Primary | ICD-10-CM

## 2021-10-18 DIAGNOSIS — G89.29 CHRONIC RIGHT HIP PAIN: ICD-10-CM

## 2021-10-18 DIAGNOSIS — M25.551 CHRONIC RIGHT HIP PAIN: ICD-10-CM

## 2021-10-18 DIAGNOSIS — E11.9 TYPE 2 DIABETES MELLITUS WITHOUT COMPLICATION, WITHOUT LONG-TERM CURRENT USE OF INSULIN (HCC): ICD-10-CM

## 2021-10-18 LAB
ABSOLUTE EOS #: 0.25 K/UL (ref 0–0.44)
ABSOLUTE IMMATURE GRANULOCYTE: 0.04 K/UL (ref 0–0.3)
ABSOLUTE LYMPH #: 0.83 K/UL (ref 1.1–3.7)
ABSOLUTE MONO #: 0.88 K/UL (ref 0.1–1.2)
ALBUMIN SERPL-MCNC: 4.7 G/DL (ref 3.5–5.2)
ALBUMIN/GLOBULIN RATIO: 2 (ref 1–2.5)
ALP BLD-CCNC: 75 U/L (ref 40–129)
ALT SERPL-CCNC: 42 U/L (ref 5–41)
ANION GAP SERPL CALCULATED.3IONS-SCNC: 19 MMOL/L (ref 9–17)
AST SERPL-CCNC: 41 U/L
BASOPHILS # BLD: 1 % (ref 0–2)
BASOPHILS ABSOLUTE: 0.06 K/UL (ref 0–0.2)
BILIRUB SERPL-MCNC: 0.5 MG/DL (ref 0.3–1.2)
BUN BLDV-MCNC: 17 MG/DL (ref 8–23)
BUN/CREAT BLD: ABNORMAL (ref 9–20)
CALCIUM SERPL-MCNC: 9.2 MG/DL (ref 8.6–10.4)
CHLORIDE BLD-SCNC: 94 MMOL/L (ref 98–107)
CHOLESTEROL/HDL RATIO: 1.9
CHOLESTEROL: 181 MG/DL
CO2: 22 MMOL/L (ref 20–31)
CREAT SERPL-MCNC: 1.12 MG/DL (ref 0.7–1.2)
DIFFERENTIAL TYPE: ABNORMAL
EKG ATRIAL RATE: 500 BPM
EKG Q-T INTERVAL: 420 MS
EKG QRS DURATION: 118 MS
EKG QTC CALCULATION (BAZETT): 466 MS
EKG R AXIS: -119 DEGREES
EKG T AXIS: 54 DEGREES
EKG VENTRICULAR RATE: 74 BPM
EOSINOPHILS RELATIVE PERCENT: 3 % (ref 1–4)
ESTIMATED AVERAGE GLUCOSE: 123 MG/DL
GFR AFRICAN AMERICAN: >60 ML/MIN
GFR NON-AFRICAN AMERICAN: >60 ML/MIN
GFR SERPL CREATININE-BSD FRML MDRD: ABNORMAL ML/MIN/{1.73_M2}
GFR SERPL CREATININE-BSD FRML MDRD: ABNORMAL ML/MIN/{1.73_M2}
GLUCOSE BLD-MCNC: 148 MG/DL (ref 70–99)
HBA1C MFR BLD: 5.9 % (ref 4–6)
HCT VFR BLD CALC: 42.8 % (ref 40.7–50.3)
HDLC SERPL-MCNC: 95 MG/DL
HEMOGLOBIN: 13.7 G/DL (ref 13–17)
IMMATURE GRANULOCYTES: 1 %
LDL CHOLESTEROL: 71 MG/DL (ref 0–130)
LYMPHOCYTES # BLD: 11 % (ref 24–43)
MCH RBC QN AUTO: 35.1 PG (ref 25.2–33.5)
MCHC RBC AUTO-ENTMCNC: 32 G/DL (ref 28.4–34.8)
MCV RBC AUTO: 109.7 FL (ref 82.6–102.9)
MONOCYTES # BLD: 12 % (ref 3–12)
NRBC AUTOMATED: 0 PER 100 WBC
PDW BLD-RTO: 14 % (ref 11.8–14.4)
PLATELET # BLD: 355 K/UL (ref 138–453)
PLATELET ESTIMATE: ABNORMAL
PMV BLD AUTO: 9.6 FL (ref 8.1–13.5)
POTASSIUM SERPL-SCNC: 5.4 MMOL/L (ref 3.7–5.3)
PROSTATE SPECIFIC ANTIGEN: 0.74 UG/L
RBC # BLD: 3.9 M/UL (ref 4.21–5.77)
RBC # BLD: ABNORMAL 10*6/UL
SEG NEUTROPHILS: 72 % (ref 36–65)
SEGMENTED NEUTROPHILS ABSOLUTE COUNT: 5.2 K/UL (ref 1.5–8.1)
SODIUM BLD-SCNC: 135 MMOL/L (ref 135–144)
TOTAL PROTEIN: 7.1 G/DL (ref 6.4–8.3)
TRIGL SERPL-MCNC: 75 MG/DL
TSH SERPL DL<=0.05 MIU/L-ACNC: 2.46 MIU/L (ref 0.3–5)
VLDLC SERPL CALC-MCNC: NORMAL MG/DL (ref 1–30)
WBC # BLD: 7.3 K/UL (ref 3.5–11.3)
WBC # BLD: ABNORMAL 10*3/UL

## 2021-10-18 PROCEDURE — 84443 ASSAY THYROID STIM HORMONE: CPT

## 2021-10-18 PROCEDURE — 36415 COLL VENOUS BLD VENIPUNCTURE: CPT

## 2021-10-18 PROCEDURE — G0103 PSA SCREENING: HCPCS

## 2021-10-18 PROCEDURE — 83036 HEMOGLOBIN GLYCOSYLATED A1C: CPT

## 2021-10-18 PROCEDURE — 80061 LIPID PANEL: CPT

## 2021-10-18 PROCEDURE — 85025 COMPLETE CBC W/AUTO DIFF WBC: CPT

## 2021-10-18 PROCEDURE — 80053 COMPREHEN METABOLIC PANEL: CPT

## 2021-10-18 PROCEDURE — 93010 ELECTROCARDIOGRAM REPORT: CPT | Performed by: INTERNAL MEDICINE

## 2021-10-27 ENCOUNTER — ANESTHESIA (OUTPATIENT)
Dept: OPERATING ROOM | Age: 66
End: 2021-10-27
Payer: MEDICARE

## 2021-10-27 ENCOUNTER — APPOINTMENT (OUTPATIENT)
Dept: GENERAL RADIOLOGY | Age: 66
End: 2021-10-27
Attending: ORTHOPAEDIC SURGERY
Payer: MEDICARE

## 2021-10-27 ENCOUNTER — HOSPITAL ENCOUNTER (OUTPATIENT)
Age: 66
Discharge: HOME | End: 2021-10-27
Attending: ORTHOPAEDIC SURGERY | Admitting: ORTHOPAEDIC SURGERY
Payer: MEDICARE

## 2021-10-27 VITALS
DIASTOLIC BLOOD PRESSURE: 73 MMHG | HEART RATE: 77 BPM | HEIGHT: 67 IN | OXYGEN SATURATION: 98 % | TEMPERATURE: 98.3 F | BODY MASS INDEX: 30.45 KG/M2 | SYSTOLIC BLOOD PRESSURE: 132 MMHG | RESPIRATION RATE: 18 BRPM | WEIGHT: 194 LBS

## 2021-10-27 VITALS — SYSTOLIC BLOOD PRESSURE: 105 MMHG | DIASTOLIC BLOOD PRESSURE: 59 MMHG | TEMPERATURE: 86.5 F | OXYGEN SATURATION: 100 %

## 2021-10-27 DIAGNOSIS — Z96.641 S/P TOTAL RIGHT HIP ARTHROPLASTY: Primary | ICD-10-CM

## 2021-10-27 LAB
GLUCOSE BLD-MCNC: 156 MG/DL (ref 75–110)
GLUCOSE BLD-MCNC: 158 MG/DL (ref 75–110)

## 2021-10-27 PROCEDURE — 3600000005 HC SURGERY LEVEL 5 BASE: Performed by: ORTHOPAEDIC SURGERY

## 2021-10-27 PROCEDURE — 7100000001 HC PACU RECOVERY - ADDTL 15 MIN: Performed by: ORTHOPAEDIC SURGERY

## 2021-10-27 PROCEDURE — 6370000000 HC RX 637 (ALT 250 FOR IP): Performed by: ORTHOPAEDIC SURGERY

## 2021-10-27 PROCEDURE — 6360000002 HC RX W HCPCS: Performed by: ORTHOPAEDIC SURGERY

## 2021-10-27 PROCEDURE — C1776 JOINT DEVICE (IMPLANTABLE): HCPCS | Performed by: ORTHOPAEDIC SURGERY

## 2021-10-27 PROCEDURE — 2500000003 HC RX 250 WO HCPCS: Performed by: NURSE ANESTHETIST, CERTIFIED REGISTERED

## 2021-10-27 PROCEDURE — 97116 GAIT TRAINING THERAPY: CPT

## 2021-10-27 PROCEDURE — 2709999900 HC NON-CHARGEABLE SUPPLY: Performed by: ORTHOPAEDIC SURGERY

## 2021-10-27 PROCEDURE — 3209999900 FLUORO FOR SURGICAL PROCEDURES

## 2021-10-27 PROCEDURE — 3700000000 HC ANESTHESIA ATTENDED CARE: Performed by: ORTHOPAEDIC SURGERY

## 2021-10-27 PROCEDURE — 3700000001 HC ADD 15 MINUTES (ANESTHESIA): Performed by: ORTHOPAEDIC SURGERY

## 2021-10-27 PROCEDURE — 6360000002 HC RX W HCPCS: Performed by: NURSE ANESTHETIST, CERTIFIED REGISTERED

## 2021-10-27 PROCEDURE — 6370000000 HC RX 637 (ALT 250 FOR IP): Performed by: ANESTHESIOLOGY

## 2021-10-27 PROCEDURE — 6360000002 HC RX W HCPCS

## 2021-10-27 PROCEDURE — 2500000003 HC RX 250 WO HCPCS: Performed by: ORTHOPAEDIC SURGERY

## 2021-10-27 PROCEDURE — 82947 ASSAY GLUCOSE BLOOD QUANT: CPT

## 2021-10-27 PROCEDURE — 2580000003 HC RX 258: Performed by: ANESTHESIOLOGY

## 2021-10-27 PROCEDURE — 6360000002 HC RX W HCPCS: Performed by: STUDENT IN AN ORGANIZED HEALTH CARE EDUCATION/TRAINING PROGRAM

## 2021-10-27 PROCEDURE — 97162 PT EVAL MOD COMPLEX 30 MIN: CPT

## 2021-10-27 PROCEDURE — 3600000015 HC SURGERY LEVEL 5 ADDTL 15MIN: Performed by: ORTHOPAEDIC SURGERY

## 2021-10-27 PROCEDURE — 97535 SELF CARE MNGMENT TRAINING: CPT

## 2021-10-27 PROCEDURE — 72170 X-RAY EXAM OF PELVIS: CPT

## 2021-10-27 PROCEDURE — 97530 THERAPEUTIC ACTIVITIES: CPT

## 2021-10-27 PROCEDURE — 73502 X-RAY EXAM HIP UNI 2-3 VIEWS: CPT

## 2021-10-27 PROCEDURE — 97166 OT EVAL MOD COMPLEX 45 MIN: CPT

## 2021-10-27 PROCEDURE — 7100000000 HC PACU RECOVERY - FIRST 15 MIN: Performed by: ORTHOPAEDIC SURGERY

## 2021-10-27 DEVICE — STEM FEM SZ 12 L144MM 133DEG DST HIP PPS HI OFFSET TYP 1: Type: IMPLANTABLE DEVICE | Site: HIP | Status: FUNCTIONAL

## 2021-10-27 DEVICE — BONE SCREW 6.5X30 SELF-TAP: Type: IMPLANTABLE DEVICE | Site: HIP | Status: FUNCTIONAL

## 2021-10-27 DEVICE — HEAD FEM DIA36MM HIP BIOLOX DELT OPT FOR G7 ACET SYS: Type: IMPLANTABLE DEVICE | Site: HIP | Status: FUNCTIONAL

## 2021-10-27 DEVICE — SLEEVE FEM STD OFFSET HIP TYP 1 TAPR FOR CERAMIC BIOLOX: Type: IMPLANTABLE DEVICE | Site: HIP | Status: FUNCTIONAL

## 2021-10-27 DEVICE — IMPL HIP SHELL ACET 3 HL 54MM: Type: IMPLANTABLE DEVICE | Site: HIP | Status: FUNCTIONAL

## 2021-10-27 DEVICE — G7 LONGEVITY NEUTRAL 36MM F: Type: IMPLANTABLE DEVICE | Site: HIP | Status: FUNCTIONAL

## 2021-10-27 RX ORDER — DEXAMETHASONE SODIUM PHOSPHATE 10 MG/ML
INJECTION INTRAMUSCULAR; INTRAVENOUS PRN
Status: DISCONTINUED | OUTPATIENT
Start: 2021-10-27 | End: 2021-10-27 | Stop reason: SDUPTHER

## 2021-10-27 RX ORDER — OXYCODONE HYDROCHLORIDE 5 MG/1
10 TABLET ORAL EVERY 4 HOURS PRN
Status: DISCONTINUED | OUTPATIENT
Start: 2021-10-27 | End: 2021-10-27 | Stop reason: HOSPADM

## 2021-10-27 RX ORDER — SODIUM CHLORIDE, SODIUM LACTATE, POTASSIUM CHLORIDE, CALCIUM CHLORIDE 600; 310; 30; 20 MG/100ML; MG/100ML; MG/100ML; MG/100ML
INJECTION, SOLUTION INTRAVENOUS CONTINUOUS
Status: DISCONTINUED | OUTPATIENT
Start: 2021-10-27 | End: 2021-10-27 | Stop reason: HOSPADM

## 2021-10-27 RX ORDER — POLYETHYLENE GLYCOL 3350 17 G/17G
17 POWDER, FOR SOLUTION ORAL DAILY PRN
Status: DISCONTINUED | OUTPATIENT
Start: 2021-10-27 | End: 2021-10-27 | Stop reason: HOSPADM

## 2021-10-27 RX ORDER — ACETAMINOPHEN 325 MG/1
650 TABLET ORAL EVERY 4 HOURS PRN
Status: DISCONTINUED | OUTPATIENT
Start: 2021-10-27 | End: 2021-10-27 | Stop reason: HOSPADM

## 2021-10-27 RX ORDER — HYDRALAZINE HYDROCHLORIDE 20 MG/ML
5 INJECTION INTRAMUSCULAR; INTRAVENOUS EVERY 10 MIN PRN
Status: DISCONTINUED | OUTPATIENT
Start: 2021-10-27 | End: 2021-10-27 | Stop reason: HOSPADM

## 2021-10-27 RX ORDER — OXYCODONE HYDROCHLORIDE AND ACETAMINOPHEN 5; 325 MG/1; MG/1
1 TABLET ORAL PRN
Status: DISCONTINUED | OUTPATIENT
Start: 2021-10-27 | End: 2021-10-27 | Stop reason: HOSPADM

## 2021-10-27 RX ORDER — SODIUM CHLORIDE 0.9 % (FLUSH) 0.9 %
5-40 SYRINGE (ML) INJECTION PRN
Status: DISCONTINUED | OUTPATIENT
Start: 2021-10-27 | End: 2021-10-27 | Stop reason: HOSPADM

## 2021-10-27 RX ORDER — CEPHALEXIN 500 MG/1
500 CAPSULE ORAL EVERY 6 HOURS SCHEDULED
Status: DISCONTINUED | OUTPATIENT
Start: 2021-10-27 | End: 2021-10-27

## 2021-10-27 RX ORDER — FAMOTIDINE 20 MG/1
20 TABLET, FILM COATED ORAL 2 TIMES DAILY
Qty: 70 TABLET | Refills: 0 | Status: SHIPPED | OUTPATIENT
Start: 2021-10-27 | End: 2022-07-15

## 2021-10-27 RX ORDER — ONDANSETRON 2 MG/ML
INJECTION INTRAMUSCULAR; INTRAVENOUS PRN
Status: DISCONTINUED | OUTPATIENT
Start: 2021-10-27 | End: 2021-10-27 | Stop reason: SDUPTHER

## 2021-10-27 RX ORDER — REPAGLINIDE 0.5 MG/1
0.5 TABLET ORAL
Status: DISCONTINUED | OUTPATIENT
Start: 2021-10-27 | End: 2021-10-27 | Stop reason: HOSPADM

## 2021-10-27 RX ORDER — DEXAMETHASONE SODIUM PHOSPHATE 10 MG/ML
10 INJECTION, SOLUTION INTRAMUSCULAR; INTRAVENOUS ONCE
Status: DISCONTINUED | OUTPATIENT
Start: 2021-10-27 | End: 2021-10-27

## 2021-10-27 RX ORDER — FENTANYL CITRATE 50 UG/ML
25 INJECTION, SOLUTION INTRAMUSCULAR; INTRAVENOUS EVERY 5 MIN PRN
Status: DISCONTINUED | OUTPATIENT
Start: 2021-10-27 | End: 2021-10-27 | Stop reason: HOSPADM

## 2021-10-27 RX ORDER — ONDANSETRON 2 MG/ML
4 INJECTION INTRAMUSCULAR; INTRAVENOUS EVERY 6 HOURS PRN
Status: DISCONTINUED | OUTPATIENT
Start: 2021-10-27 | End: 2021-10-27 | Stop reason: HOSPADM

## 2021-10-27 RX ORDER — AMIODARONE HYDROCHLORIDE 400 MG/1
400 TABLET ORAL DAILY
Status: DISCONTINUED | OUTPATIENT
Start: 2021-10-27 | End: 2021-10-27 | Stop reason: HOSPADM

## 2021-10-27 RX ORDER — SODIUM CHLORIDE 9 MG/ML
25 INJECTION, SOLUTION INTRAVENOUS PRN
Status: DISCONTINUED | OUTPATIENT
Start: 2021-10-27 | End: 2021-10-27 | Stop reason: HOSPADM

## 2021-10-27 RX ORDER — HYDROMORPHONE HCL 110MG/55ML
PATIENT CONTROLLED ANALGESIA SYRINGE INTRAVENOUS PRN
Status: DISCONTINUED | OUTPATIENT
Start: 2021-10-27 | End: 2021-10-27 | Stop reason: SDUPTHER

## 2021-10-27 RX ORDER — SODIUM CHLORIDE 0.9 % (FLUSH) 0.9 %
5-40 SYRINGE (ML) INJECTION EVERY 12 HOURS SCHEDULED
Status: DISCONTINUED | OUTPATIENT
Start: 2021-10-27 | End: 2021-10-27 | Stop reason: HOSPADM

## 2021-10-27 RX ORDER — KETOROLAC TROMETHAMINE 30 MG/ML
INJECTION, SOLUTION INTRAMUSCULAR; INTRAVENOUS
Status: COMPLETED
Start: 2021-10-27 | End: 2021-10-27

## 2021-10-27 RX ORDER — BUPIVACAINE HYDROCHLORIDE AND EPINEPHRINE 2.5; 5 MG/ML; UG/ML
INJECTION, SOLUTION EPIDURAL; INFILTRATION; INTRACAUDAL; PERINEURAL PRN
Status: DISCONTINUED | OUTPATIENT
Start: 2021-10-27 | End: 2021-10-27 | Stop reason: HOSPADM

## 2021-10-27 RX ORDER — ACETAMINOPHEN 500 MG
1000 TABLET ORAL ONCE
Status: DISCONTINUED | OUTPATIENT
Start: 2021-10-27 | End: 2021-10-27

## 2021-10-27 RX ORDER — ACETAMINOPHEN 500 MG
1000 TABLET ORAL ONCE
Status: COMPLETED | OUTPATIENT
Start: 2021-10-27 | End: 2021-10-27

## 2021-10-27 RX ORDER — TRANEXAMIC ACID 100 MG/ML
INJECTION, SOLUTION INTRAVENOUS PRN
Status: DISCONTINUED | OUTPATIENT
Start: 2021-10-27 | End: 2021-10-27 | Stop reason: SDUPTHER

## 2021-10-27 RX ORDER — FAMOTIDINE 20 MG/1
20 TABLET, FILM COATED ORAL 2 TIMES DAILY
Status: DISCONTINUED | OUTPATIENT
Start: 2021-10-27 | End: 2021-10-27 | Stop reason: HOSPADM

## 2021-10-27 RX ORDER — PROMETHAZINE HYDROCHLORIDE 25 MG/ML
6.25 INJECTION, SOLUTION INTRAMUSCULAR; INTRAVENOUS
Status: DISCONTINUED | OUTPATIENT
Start: 2021-10-27 | End: 2021-10-27 | Stop reason: HOSPADM

## 2021-10-27 RX ORDER — LABETALOL HYDROCHLORIDE 5 MG/ML
5 INJECTION, SOLUTION INTRAVENOUS EVERY 10 MIN PRN
Status: DISCONTINUED | OUTPATIENT
Start: 2021-10-27 | End: 2021-10-27 | Stop reason: HOSPADM

## 2021-10-27 RX ORDER — ONDANSETRON 4 MG/1
4 TABLET, ORALLY DISINTEGRATING ORAL EVERY 8 HOURS PRN
Status: DISCONTINUED | OUTPATIENT
Start: 2021-10-27 | End: 2021-10-27 | Stop reason: HOSPADM

## 2021-10-27 RX ORDER — LISINOPRIL 5 MG/1
5 TABLET ORAL DAILY
Status: DISCONTINUED | OUTPATIENT
Start: 2021-10-27 | End: 2021-10-27 | Stop reason: HOSPADM

## 2021-10-27 RX ORDER — PROPOFOL 10 MG/ML
INJECTION, EMULSION INTRAVENOUS PRN
Status: DISCONTINUED | OUTPATIENT
Start: 2021-10-27 | End: 2021-10-27 | Stop reason: SDUPTHER

## 2021-10-27 RX ORDER — LIDOCAINE HYDROCHLORIDE 10 MG/ML
1 INJECTION, SOLUTION EPIDURAL; INFILTRATION; INTRACAUDAL; PERINEURAL
Status: DISCONTINUED | OUTPATIENT
Start: 2021-10-28 | End: 2021-10-27 | Stop reason: HOSPADM

## 2021-10-27 RX ORDER — SODIUM CHLORIDE, SODIUM LACTATE, POTASSIUM CHLORIDE, CALCIUM CHLORIDE 600; 310; 30; 20 MG/100ML; MG/100ML; MG/100ML; MG/100ML
INJECTION, SOLUTION INTRAVENOUS CONTINUOUS
Status: DISCONTINUED | OUTPATIENT
Start: 2021-10-28 | End: 2021-10-27

## 2021-10-27 RX ORDER — FAMOTIDINE 20 MG/1
20 TABLET, FILM COATED ORAL ONCE
Status: COMPLETED | OUTPATIENT
Start: 2021-10-27 | End: 2021-10-27

## 2021-10-27 RX ORDER — ASPIRIN 325 MG
325 TABLET, DELAYED RELEASE (ENTERIC COATED) ORAL 2 TIMES DAILY
Qty: 70 TABLET | Refills: 0 | Status: SHIPPED | OUTPATIENT
Start: 2021-10-27 | End: 2022-07-15

## 2021-10-27 RX ORDER — KETOROLAC TROMETHAMINE 30 MG/ML
INJECTION, SOLUTION INTRAMUSCULAR; INTRAVENOUS PRN
Status: DISCONTINUED | OUTPATIENT
Start: 2021-10-27 | End: 2021-10-27 | Stop reason: SDUPTHER

## 2021-10-27 RX ORDER — HYDROMORPHONE HYDROCHLORIDE 1 MG/ML
0.5 INJECTION, SOLUTION INTRAMUSCULAR; INTRAVENOUS; SUBCUTANEOUS EVERY 5 MIN PRN
Status: DISCONTINUED | OUTPATIENT
Start: 2021-10-27 | End: 2021-10-27 | Stop reason: HOSPADM

## 2021-10-27 RX ORDER — CEPHALEXIN 500 MG/1
500 CAPSULE ORAL 4 TIMES DAILY
Qty: 8 CAPSULE | Refills: 0 | Status: SHIPPED | OUTPATIENT
Start: 2021-10-27 | End: 2021-10-29

## 2021-10-27 RX ORDER — OXYCODONE HYDROCHLORIDE 5 MG/1
5 TABLET ORAL EVERY 4 HOURS PRN
Status: DISCONTINUED | OUTPATIENT
Start: 2021-10-27 | End: 2021-10-27 | Stop reason: HOSPADM

## 2021-10-27 RX ORDER — CELECOXIB 200 MG/1
200 CAPSULE ORAL ONCE
Status: COMPLETED | OUTPATIENT
Start: 2021-10-27 | End: 2021-10-27

## 2021-10-27 RX ORDER — ONDANSETRON 2 MG/ML
4 INJECTION INTRAMUSCULAR; INTRAVENOUS
Status: DISCONTINUED | OUTPATIENT
Start: 2021-10-27 | End: 2021-10-27 | Stop reason: HOSPADM

## 2021-10-27 RX ORDER — GABAPENTIN 300 MG/1
300 CAPSULE ORAL ONCE
Status: COMPLETED | OUTPATIENT
Start: 2021-10-27 | End: 2021-10-27

## 2021-10-27 RX ORDER — LIDOCAINE HYDROCHLORIDE 20 MG/ML
INJECTION, SOLUTION INFILTRATION; PERINEURAL PRN
Status: DISCONTINUED | OUTPATIENT
Start: 2021-10-27 | End: 2021-10-27 | Stop reason: SDUPTHER

## 2021-10-27 RX ORDER — KETOROLAC TROMETHAMINE 30 MG/ML
30 INJECTION, SOLUTION INTRAMUSCULAR; INTRAVENOUS ONCE
Status: DISCONTINUED | OUTPATIENT
Start: 2021-10-27 | End: 2021-10-27

## 2021-10-27 RX ORDER — OXYCODONE HYDROCHLORIDE AND ACETAMINOPHEN 5; 325 MG/1; MG/1
2 TABLET ORAL PRN
Status: DISCONTINUED | OUTPATIENT
Start: 2021-10-27 | End: 2021-10-27 | Stop reason: HOSPADM

## 2021-10-27 RX ORDER — BUPIVACAINE HYDROCHLORIDE AND EPINEPHRINE 2.5; 5 MG/ML; UG/ML
INJECTION, SOLUTION EPIDURAL; INFILTRATION; INTRACAUDAL; PERINEURAL
Status: DISCONTINUED
Start: 2021-10-27 | End: 2021-10-27 | Stop reason: HOSPADM

## 2021-10-27 RX ORDER — TRANEXAMIC ACID 100 MG/ML
INJECTION, SOLUTION INTRAVENOUS
Status: COMPLETED
Start: 2021-10-27 | End: 2021-10-27

## 2021-10-27 RX ORDER — TRANEXAMIC ACID 650 1/1
1300 TABLET ORAL ONCE
Status: DISCONTINUED | OUTPATIENT
Start: 2021-10-27 | End: 2021-10-27

## 2021-10-27 RX ORDER — OXYCODONE HYDROCHLORIDE 5 MG/1
5-10 TABLET ORAL EVERY 6 HOURS PRN
Qty: 42 TABLET | Refills: 0 | Status: SHIPPED | OUTPATIENT
Start: 2021-10-27 | End: 2021-11-03

## 2021-10-27 RX ADMIN — CELECOXIB 200 MG: 200 CAPSULE ORAL at 07:15

## 2021-10-27 RX ADMIN — KETOROLAC TROMETHAMINE 30 MG: 30 INJECTION, SOLUTION INTRAMUSCULAR; INTRAVENOUS at 10:36

## 2021-10-27 RX ADMIN — HYDROMORPHONE HYDROCHLORIDE 0.5 MG: 1 INJECTION, SOLUTION INTRAMUSCULAR; INTRAVENOUS; SUBCUTANEOUS at 11:08

## 2021-10-27 RX ADMIN — LIDOCAINE HYDROCHLORIDE 100 MG: 20 INJECTION, SOLUTION INFILTRATION; PERINEURAL at 08:49

## 2021-10-27 RX ADMIN — HYDROMORPHONE HYDROCHLORIDE 0.5 MG: 2 INJECTION INTRAMUSCULAR; INTRAVENOUS; SUBCUTANEOUS at 09:21

## 2021-10-27 RX ADMIN — GABAPENTIN 300 MG: 300 CAPSULE ORAL at 07:15

## 2021-10-27 RX ADMIN — ACETAMINOPHEN 1000 MG: 500 TABLET ORAL at 07:15

## 2021-10-27 RX ADMIN — PROPOFOL 200 MG: 10 INJECTION, EMULSION INTRAVENOUS at 08:49

## 2021-10-27 RX ADMIN — SODIUM CHLORIDE, POTASSIUM CHLORIDE, SODIUM LACTATE AND CALCIUM CHLORIDE: 600; 310; 30; 20 INJECTION, SOLUTION INTRAVENOUS at 10:00

## 2021-10-27 RX ADMIN — CEFAZOLIN 2000 MG: 10 INJECTION, POWDER, FOR SOLUTION INTRAVENOUS at 09:01

## 2021-10-27 RX ADMIN — FAMOTIDINE 20 MG: 20 TABLET, FILM COATED ORAL at 07:10

## 2021-10-27 RX ADMIN — TRANEXAMIC ACID 2000 MG: 100 INJECTION, SOLUTION INTRAVENOUS at 09:07

## 2021-10-27 RX ADMIN — DEXAMETHASONE SODIUM PHOSPHATE 10 MG: 10 INJECTION INTRAMUSCULAR; INTRAVENOUS at 09:02

## 2021-10-27 RX ADMIN — CEFAZOLIN 2000 MG: 10 INJECTION, POWDER, FOR SOLUTION INTRAVENOUS at 17:03

## 2021-10-27 RX ADMIN — HYDROMORPHONE HYDROCHLORIDE 1 MG: 2 INJECTION INTRAMUSCULAR; INTRAVENOUS; SUBCUTANEOUS at 08:49

## 2021-10-27 RX ADMIN — HYDROMORPHONE HYDROCHLORIDE 0.5 MG: 2 INJECTION INTRAMUSCULAR; INTRAVENOUS; SUBCUTANEOUS at 09:44

## 2021-10-27 RX ADMIN — ONDANSETRON 4 MG: 2 INJECTION, SOLUTION INTRAMUSCULAR; INTRAVENOUS at 09:02

## 2021-10-27 RX ADMIN — SODIUM CHLORIDE, POTASSIUM CHLORIDE, SODIUM LACTATE AND CALCIUM CHLORIDE: 600; 310; 30; 20 INJECTION, SOLUTION INTRAVENOUS at 07:10

## 2021-10-27 RX ADMIN — ACETAMINOPHEN 650 MG: 325 TABLET ORAL at 13:57

## 2021-10-27 ASSESSMENT — PULMONARY FUNCTION TESTS
PIF_VALUE: 11
PIF_VALUE: 14
PIF_VALUE: 3
PIF_VALUE: 3
PIF_VALUE: 10
PIF_VALUE: 14
PIF_VALUE: 3
PIF_VALUE: 4
PIF_VALUE: 9
PIF_VALUE: 9
PIF_VALUE: 2
PIF_VALUE: 10
PIF_VALUE: 1
PIF_VALUE: 4
PIF_VALUE: 12
PIF_VALUE: 14
PIF_VALUE: 14
PIF_VALUE: 3
PIF_VALUE: 10
PIF_VALUE: 4
PIF_VALUE: 4
PIF_VALUE: 9
PIF_VALUE: 10
PIF_VALUE: 1
PIF_VALUE: 9
PIF_VALUE: 10
PIF_VALUE: 9
PIF_VALUE: 13
PIF_VALUE: 8
PIF_VALUE: 15
PIF_VALUE: 3
PIF_VALUE: 20
PIF_VALUE: 3
PIF_VALUE: 3
PIF_VALUE: 14
PIF_VALUE: 9
PIF_VALUE: 9
PIF_VALUE: 14
PIF_VALUE: 11
PIF_VALUE: 9
PIF_VALUE: 9
PIF_VALUE: 11
PIF_VALUE: 13
PIF_VALUE: 10
PIF_VALUE: 8
PIF_VALUE: 11
PIF_VALUE: 3
PIF_VALUE: 14
PIF_VALUE: 10
PIF_VALUE: 9
PIF_VALUE: 10
PIF_VALUE: 9
PIF_VALUE: 14
PIF_VALUE: 2
PIF_VALUE: 14
PIF_VALUE: 11
PIF_VALUE: 1
PIF_VALUE: 9
PIF_VALUE: 4
PIF_VALUE: 10
PIF_VALUE: 10
PIF_VALUE: 3
PIF_VALUE: 10
PIF_VALUE: 10
PIF_VALUE: 14
PIF_VALUE: 14
PIF_VALUE: 3
PIF_VALUE: 0
PIF_VALUE: 10
PIF_VALUE: 10
PIF_VALUE: 14
PIF_VALUE: 10
PIF_VALUE: 1
PIF_VALUE: 10
PIF_VALUE: 8
PIF_VALUE: 9
PIF_VALUE: 9
PIF_VALUE: 10
PIF_VALUE: 10
PIF_VALUE: 3
PIF_VALUE: 10
PIF_VALUE: 3
PIF_VALUE: 13
PIF_VALUE: 3
PIF_VALUE: 14
PIF_VALUE: 10
PIF_VALUE: 2
PIF_VALUE: 10
PIF_VALUE: 14
PIF_VALUE: 3
PIF_VALUE: 14
PIF_VALUE: 13
PIF_VALUE: 9
PIF_VALUE: 13
PIF_VALUE: 3
PIF_VALUE: 9
PIF_VALUE: 2
PIF_VALUE: 3
PIF_VALUE: 1
PIF_VALUE: 10
PIF_VALUE: 14

## 2021-10-27 ASSESSMENT — PAIN DESCRIPTION - ORIENTATION
ORIENTATION: RIGHT
ORIENTATION: RIGHT

## 2021-10-27 ASSESSMENT — PAIN SCALES - GENERAL
PAINLEVEL_OUTOF10: 8
PAINLEVEL_OUTOF10: 0
PAINLEVEL_OUTOF10: 0
PAINLEVEL_OUTOF10: 3

## 2021-10-27 ASSESSMENT — PAIN DESCRIPTION - LOCATION
LOCATION: HIP
LOCATION: HIP

## 2021-10-27 ASSESSMENT — PAIN DESCRIPTION - FREQUENCY: FREQUENCY: CONTINUOUS

## 2021-10-27 ASSESSMENT — PAIN DESCRIPTION - PAIN TYPE
TYPE: SURGICAL PAIN
TYPE: SURGICAL PAIN

## 2021-10-27 ASSESSMENT — PAIN DESCRIPTION - ONSET: ONSET: ON-GOING

## 2021-10-27 ASSESSMENT — PAIN DESCRIPTION - DESCRIPTORS
DESCRIPTORS: ACHING
DESCRIPTORS: DISCOMFORT

## 2021-10-27 NOTE — H&P
Davon Kunz is an 78 y.o.  male. He has severe right hip pain and OA. He is planned for MASON on the right side. Past Medical History:   Diagnosis Date    A-fib ([facility]     Abnormal EKG     Arrhythmia     Arthritis     CAD (coronary artery disease)     Cardiomyopathy    Caffeine use     2 cups coffee per day    Cardiomyopathy ([facility]     per medical record    [facility]     CHF (congestive heart failure) ([facility] 2013    COPD (chronic obstructive pulmonary disease) ([facility]     On Inhalers;doesn't use routinely    Diabetes mellitus ([facility]     Disturbance of sleep     ED (erectile dysfunction)     Hyperlipidemia     Hypertension     Left atrial enlargement     per medical record    Mild pulmonary hypertension ([facility]     per medical record    Obesity     YOLY (obstructive sleep apnea)     per medical record. Pt denies. Pt does not wear a CPAP or BiPAP. Rheumatoid arthritis ([facility]        Allergies: Allergies   Allergen Reactions    Fentanyl Shortness Of Breath    Cherry     Ezetimibe-Simvastatin Other (See Comments)     Unsure reaction       SH:  NC  FH:  NC    Active Problems:    * No active hospital problems. *  Resolved Problems:    * No resolved hospital problems. *    Blood pressure 139/72, pulse 74, temperature 97.3 °F (36.3 °C), temperature source Temporal, resp. rate 18, SpO2 99 %. Review of Systems  No fever/chills      Physical Exam  Normal appearance. Normal affect. Normal mood. No wheezes    Right hip skin intact, no erythema. Assessment:  Right hip OA    Plan:  I recommend right MASON for treatment of pain. Risk of infection, persistent pain, repeat surgery, blood clot, dislocation, leg length inequality, limp. All questions answered, consent signed.     Avinash Montana MD  10/27/2021

## 2021-10-27 NOTE — PROGRESS NOTES
Patient admitted to room 2104  From PACU via bed with belongings. VSS  Patient oriented to room and call light. Assessment as charted. Admission database done. Orders and plan of care reviewed. Call light in reach. Will continue to monitor.

## 2021-10-27 NOTE — FLOWSHEET NOTE
Patient states about his medical condition, states surgery. Patient shares mostly about his search in a belief system. States no major needs, worries, fears.  shared in presence, prayers. Follow up as needed. 10/27/21 5216   Encounter Summary   Services provided to: Patient   Referral/Consult From: [facility]   (10-27-21)   Complexity of Encounter Moderate   Length of Encounter 15 minutes   Spiritual Assessment Completed Yes   Routine   Type Initial   Spiritual/Presybeterian   Type Spiritual support   Assessment Calm; Approachable   Intervention Active listening;Explored feelings, thoughts, concerns;Prayer;Sustaining presence/ Ministry of presence; Discussed belief system/Adventist practices/dhruv;Discussed relationship with God;Discussed illness/injury and it's impact   Outcome Expressed gratitude;Receptive;Engaged in conversation;Expressed feelings/needs/concerns

## 2021-10-27 NOTE — PROGRESS NOTES
Occupational Therapy   Occupational Therapy Initial Assessment  Date: 10/27/2021   Patient Name: Sherel Severance  MRN: [id number]     : 1955    RN Marcella Padilla reports patient is medically stable for therapy treatment this date. Chart reviewed prior to treatment and patient is agreeable for therapy. All lines intact and patient positioned comfortably at end of treatment. All patient needs addressed prior to ending therapy session. Date of Service: 10/27/2021    Discharge Recommendations:     OT Equipment Recommendations  Equipment Needed: Yes  Mobility Devices: ADL Assistive Devices  ADL Assistive Devices: Long-handled Shoe Horn;Long-handled Sponge;Reacher;Sock-Aid Hard    Assessment   Performance deficits / Impairments: Decreased functional mobility ; Decreased safe awareness;Decreased cognition;Decreased ADL status; Decreased strength;Decreased endurance;Decreased high-level IADLs;Decreased fine motor control;Decreased posture;Decreased coordination;Decreased balance  Assessment: Pt would benefit from continued skilled OT services while IP to increase safety and I during functional tasks to return home at prior level of function as able. Prognosis: Good  Decision Making: Medium Complexity  OT Education: OT Role;Transfer Training;Energy Conservation;Plan of Care;ADL Adaptive Strategies  Patient Education: benefits of being oob, recommendations for continued therapy, safety in function, general safety awareness, RW safety, Socrates hose wear/care schedule  REQUIRES OT FOLLOW UP: Yes  Activity Tolerance  Activity Tolerance: Patient Tolerated treatment well  Activity Tolerance: fair  Safety Devices  Safety Devices in place: Yes  Type of devices: Nurse notified;Gait belt;Call light within reach; Patient at risk for falls; Left in bed;Bed alarm in place           Patient Diagnosis(es): The encounter diagnosis was S/P total right hip arthroplasty.      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 No recent falls       Objective   Vision: Within Functional Limits (Pt reports previous cataract surgery)  Vision Exceptions: Wears glasses for reading  Hearing: Within functional limits    Orientation  Overall Orientation Status: Within Functional Limits  Observation/Palpation  Posture: Good  Observation: Patient moves quickly / impulsively, requires cues to move more slowly / cautiously. Balance  Sitting Balance: Stand by assistance  Standing Balance: Minimal assistance (with RW pt progressed to CGA)  Standing Balance  Time: standing rahlu ~ 3 min  Activity: functional mobility  Comment: Pt completed stair tree with Min Assist up and down. Required Mod verbal cues for safety awareness, slowing down movements, pacing self, step sequence, hand placement on rails and awareness/assist with lines all to increase safety. Functional Mobility  Functional - Mobility Device: Rolling Walker  Activity:  (bed to door, door back to bed)  Assist Level: Minimal assistance  Functional Mobility Comments: Pt is impulsive with minimal safety awareness. Pt required Mod verbal cues for upright posture, RW safety, scanning environment, slowing down movements, pacing self, line mgmt and sequencing all to increase safety.   Toilet Transfers  Toilet Transfer: Unable to assess  Toilet Transfers Comments: Pt with no needs at this time       ADL  Feeding: Setup  Grooming: Setup;Stand by assistance (seated)  UE Bathing: Setup;Stand by assistance  LE Bathing: Setup; Moderate assistance  UE Dressing: Setup;Minimal assistance (with hosp gown)  LE Dressing: Setup;Maximum assistance (to don B Socrates hose/socks while supine in bed)  Toileting: Minimal assistance       Tone RUE  RUE Tone: Normotonic  Tone LUE  LUE Tone: Normotonic  Coordination  Movements Are Fluid And Coordinated: No  Coordination and Movement description: Fine motor impairments; Left UE;Right UE;Decreased accuracy; Decreased speed        Transfers  Sit to stand: Minimal assistance;Contact guard assistance (with RW)  Stand to sit: Minimal assistance;Contact guard assistance  Transfer Comments: Initially pt required Min Assist pt progressed to CGA. Pt required Mod verbal cues for RW safety, upright posture, pacing self, controlled stand to sit, squaring self/AD up to surface and reaching back prior to sitting, awareness/assist with lines all to increase safety. Cognition  Overall Cognitive Status: Exceptions  Arousal/Alertness: Appropriate responses to stimuli  Following Commands: Follows multistep commands with repitition; Follows multistep commands with increased time  Attention Span: Attends with cues to redirect  Memory: Appears intact  Safety Judgement: Decreased awareness of need for safety;Decreased awareness of need for assistance  Problem Solving: Decreased awareness of errors;Assistance required to identify errors made;Assistance required to generate solutions;Assistance required to implement solutions;Assistance required to correct errors made  Insights: Decreased awareness of deficits  Initiation: Does not require cues  Sequencing: Requires cues for some  Cognition Comment: Pt with tangential speech throughout session needing constant redirection.         Sensation  Overall Sensation Status: Impaired (Pt reports diabetic neuropathy at night)        LUE AROM (degrees)  LUE AROM : WFL  RUE AROM (degrees)  RUE AROM : WFL  LUE Strength  Gross LUE Strength: WFL  LUE Strength Comment: 4+/5  RUE Strength  Gross RUE Strength: WFL  RUE Strength Comment: 4+/5                   Plan   Plan  Times per week: 5-6x/wk 1-2x/day  Current Treatment Recommendations: Strengthening, Endurance Training, Balance Training, Functional Mobility Training, Safety Education & Training, Pain Management, Home Management Training, Self-Care / ADL, Equipment Evaluation, Education, & procurement, Patient/Caregiver Education & Training        AM-PAC Score        AM-PAC Inpatient Daily Activity Raw Score: 17 (10/27/21 1454)  AM-PAC Inpatient ADL T-Scale Score : 37.26 (10/27/21 1454)  ADL Inpatient CMS 0-100% Score: 50.11 (10/27/21 1454)  ADL Inpatient CMS G-Code Modifier : CK (10/27/21 1454)    Goals  Short term goals  Time Frame for Short term goals: By discharge, pt to demo  Short term goal 1: ADL transfers and functional mobility to SBA with use of AD and proper . Short term goal 2: bed mobility to Mod I with use of bedrails as needed. Short term goal 3: toileting to SBA with use of AD/grab bars as needed. Short term goal 4: UB ADLs to Set up and LB ADLs to SBA with use of AD/AE as needed. Short term goal 5: increased standing rahul > 10 min with CGA to reduce risk of fall during functional tasks. Long term goals  Long term goal 1: Pt to be I with fall prevention education, EC/WS tech, BUE HEP and recommendations for AE with use of handouts as needed. Patient Goals   Patient goals : To go home! Therapy Time   Individual Concurrent Group Co-treatment   Time In 1343         Time Out 5915         Minutes 60          tx time: 50 min    Co-treatment with PT warranted first time up day of surgery. Cotx due to potential risk of decreased sensation, muscle control and proprioception from spinal epidural and/or regional block.  Decreased safety and independence requiring 2 skilled therapy professionals to address individual discipline's goals.           Broderick Wang, OT

## 2021-10-27 NOTE — PROGRESS NOTES
CLINICAL PHARMACY NOTE: MEDS TO BEDS    Total # of Prescriptions Filled: 4   The following medications were delivered to the patient:  · OXYCODONE 5MG  · ASPIRIN 325MG  · FAMOTIDINE 20MG  · CEPHALEXIN 500MG    Additional Documentation:

## 2021-10-27 NOTE — PROGRESS NOTES
Ortho Coordinator Daily Rounding Note    Admission Date:   10/27/2021 Graciela Webb is a 78 y.o.  male    Post Op Day # 0 RTHA    Labs:         No results for input(s): WBC, HGB, PLT in the last 72 hours. No results for input(s): NA, K, CL, CO2, BUN, CREATININE, GLUCOSE in the last 72 hours. Met with:     Patient  Patient's LOC:   alert and oriented X3  Patient progress   GOOD  Patient's Pain:   Patient rates pain 4  Oral Intake:    good  Output:    HAS NOT VOIDED POST-OP   Singh in:   no  ON-Q:    no    Walker/DME:  Walker/DME needed:  No  DME needed:    walker   DME Agency:    PT HAS A FWW    Anticoagulation:  Anticoagulation:  325MG ASA BID  Prescription in chart:  yes     Continuity of Care: The [address] [place] form is on the chart. The [address] form is not signed by the physician. Discharge Planning:  Confirmed with patient that discharge plan is Home. Agency/Facility chosen: NONE PT STATED NOT NEEDED  Awaiting pre-certification no  Anticipated discharge date: 10/27/2021    Patient's questions and concerns were answered. Actively listened and provided reassurance.      Electronically signed by: Ryan Marcelino RN on 10/27/2021 at 12:46 PM     ;

## 2021-10-27 NOTE — CARE COORDINATION
S/P total rt hip per Dr. Bianka Acosta. Per Pavel Valdez Coordinator pt has walker and declines need for HHC. Plan is for same day discharge.

## 2021-10-27 NOTE — OP NOTE
Operative Note      Patient: Jia Munoz  YOB: 1955  MRN: [id number]    Date of Procedure: 10/27/2021    Pre-Op Diagnosis: DX OA RIGHT HIP    Post-Op Diagnosis: Same       Procedure(s):  RIGHT HIP TOTAL ARTHROPLASTY ANTERIOR APPROACH   BONNY    Surgeon(s):  Neil Lowe MD    Assistant:   Surgical Assistant: Prince Kraus  Resident: Claudell Muckle, DO; Janeth Dwyer DO    Anesthesia: General    Estimated Blood Loss (mL): Minimal    Complications: None    Specimens:   * No specimens in log *    Implants:  Implant Name Type Inv. Item Serial No.  Lot No. LRB No. Used Action   G7 LONGEVITY NEUTRAL 36MM F  G7 LONGEVITY NEUTRAL 36MM F  [employer]- [id number] Right 1 Implanted   BONE SCREW 6.5X30 SELF-TAP  BONE SCREW 6.5X30 SELF-TAP  [employer]- [id number] Right 1 Implanted   IMPL HIP SHELL ACET 3 HL 54MM Hip IMPL HIP SHELL ACET 3 HL 54MM  [employer] [id number] Right 1 Implanted   STEM FEM SZ 12 L144MM 133DEG DST HIP PPS HI OFFSET TYP 1  STEM FEM SZ 12 L144MM 133DEG DST HIP PPS HI OFFSET TYP 1  [employer]- [id number] Right 1 Implanted   HEAD FEM [id number] HIP BIOLOX DELT OPT FOR G7 ACET SYS  HEAD FEM [id number] HIP BIOLOX DELT OPT FOR G7 ACET SYS  [employer]- [id number] Right 1 Implanted   SLEEVE FEM STD OFFSET HIP TYP 1 TAPR FOR CERAMIC BIOLOX  SLEEVE FEM STD OFFSET HIP TYP 1 TAPR FOR [facility] [employer]- [id number] Right 1 Implanted         Drains: * No LDAs found *    Findings: DJD right hip    Detailed Description of Procedure:   Patient was taken to the operating room and placed supine on the operating table. The aforementioned anesthesia was obtained. Bilateral lower extremities were then prepped and draped sterilely. Time-out was completed and preoperative antibiotics were confirmed given. The incision was marked out.   C-arm fluoroscopy confirmed this location of the incision based on anatomic landmarks. The incision was then made in the skin on the right hip starting about 2 cm lateral and distal to the ASIS. Hemostasis was obtained in the subcutaneous layer. I then identified the tensor fascia and incised it. I then retracted the the tensor muscle laterally and developed the deep fascia. I then ran this proximally and identified the circumflex vessels. These vessels were then identified and coagulated. There were then divided using electrocautery. I then identified the femoral neck and used a Griffin elevator to elevate the soft tissue above it. I then placed retractors superior and inferior to the femoral neck as well as into the joint. I then incised the capsule and did a capsulotomy of the capsule. Once I had done this I had visualization of the femoral neck. I then identified the neck cut was C-arm fluoroscopy. I then made a neck cut as well as additional cut and removed the intercalary fragment. I then removed the residual head. I then placed the leg into a figure 4 position and then incised additional inferior capsule. I exposed the acetabulum. The labrum residual was excised. I then reamed under C-arm fluoroscopy from 45 to 53. The fit appeared to be good and the preparation was acceptable. I then impacted the cup and seated it within the acetabulum. I drilled and placed a superior acetabular screw. I placed the liner into position and malleted it down, seating it completely. I then excised a bit of the lateral capsular tissue using electrocautery. I placed the Omni-Tract retractor just proximal to the gluteus insertion. I then extended, abducted, and externally rotated the femur. I then incised additional lateral capsule until I had full exposure of the proximal femur. The posterior capsule remained intact. I used the Omni-Tract retractor to elevate and assist to get the proximal femur into position.   The leg was further extended, adducted, and externally rotated. I then removed additional bone between the femoral neck and greater trochanter. I then used the cookie cutter for the start point and then used rasps to open the femoral canal.  I then broached from 4 to 12 which sat appropriately. I then trialed this contruct with the lateral offset neck with a -6 and standard neck lengths. Stability was acceptable with the standard. I then checked leg lengths clinically as well as radiologically. Offset was also checked. There was minimal sonia overall, and stability was acceptable. I redislocated the hip. I re-exposed the hip and then removed the broach and the trial.  I then placed the implant down seating it completely. I then trialed this construct with the standard neck length and noted that it was acceptable. I redislocated the hip and then seated the head onto the clean dry taper. I relocated the hip and stability was identical.      C-arm fluoroscopy confirmed acceptable total hip arthroplasty. I closed the capsule with #2-0 Vicryl. I then closed the tensor fascia to itself with Quill suture. The subcutaneous tissue was closed with a running 0 Vicryl suture. The dermis with #2-0 Quill suture. The skin with Monocryl suture. Glue was applied. Sterile dressing was applied. Resident physician assisted in closure under supervision. At the conclusion of the case all sponge and needle counts were correct. I was present for the critical portions of this case.             Electronically signed by Bernie Graham MD on 10/27/2021 at 10:08 AM

## 2021-10-27 NOTE — ANESTHESIA PRE PROCEDURE
Department of Anesthesiology  Preprocedure Note       Name:  Saira Acuna   Age:  78 y.o.  :  1955                                          MRN:  [id number]         Date:  10/27/2021      Surgeon: Marilee Rivas):  Nyasia Waite MD    Procedure: Procedure(s):  RIGHT HIP TOTAL ARTHROPLASTY ANTERIOR APPROACH   BONNY    Medications prior to admission:   Prior to Admission medications    Medication Sig Start Date End Date Taking? Authorizing Provider   oxyCODONE (ROXICODONE) 5 MG immediate release tablet Take 1-2 tablets by mouth every 6 hours as needed for Pain for up to 7 days. Intended supply: 7 days.  Take lowest dose possible to manage pain 10/27/21 11/3/21 Yes Seth Jurado DO   cephALEXin [facility]) 500 MG capsule Take 1 capsule by mouth 4 times daily for 2 days 10/27/21 10/29/21 Yes Seth Jurado DO   aspirin 325 MG EC tablet Take 1 tablet by mouth 2 times daily 10/27/21 12/1/21 Yes Seth Jurado DO   famotidine (PEPCID) 20 MG tablet Take 1 tablet by mouth 2 times daily 10/27/21 12/1/21 Yes Seth Jurado DO   amiodarone (CORDARONE) 200 MG tablet Take 400 mg by mouth daily   Yes Historical Provider, MD   nateglinide (STARLIX) 60 MG tablet Take 60 mg by mouth 3 times daily (before meals)  6/27/15  Yes Historical Provider, MD   lisinopril (PRINIVIL;ZESTRIL) 5 MG tablet Take 5 mg by mouth daily  14  Yes Historical Provider, MD   methotrexate (RHEUMATREX) 2.5 MG chemo tablet Take 3 tablets twice weekly 21   Evans Puentes DO   Dulaglutide (TRULICITY) 1.5 FA/2.9SU SOPN Inject 3 mg into the skin once a week Saturday     Historical Provider, MD       Current medications:    Current Facility-Administered Medications   Medication Dose Route Frequency Provider Last Rate Last Admin    [START ON 10/28/2021] lactated ringers infusion   IntraVENous Continuous Rosa Longoria MD 50 mL/hr at 10/27/21 0710 New Bag at 10/27/21 1000    [START ON 10/28/2021] lidocaine PF 1 % injection 1 mL  1 mL IntraDERmal Once PRN Patricia Dasilva MD        bupivacaine-EPINEPHrine PF (MARCAINE-w/EPINEPHrine) 0.25% -1:189284 injection             bupivacaine-EPINEPHrine PF (MARCAINE-w/EPINEPHrine) 0.25% -1:986084 injection    PRN Escobar Funez MD        fentaNYL (SUBLIMAZE) injection 25 mcg  25 mcg IntraVENous Q5 Min PRN Particia Rosanna, DO        HYDROmorphone HCl PF (DILAUDID) injection 0.5 mg  0.5 mg IntraVENous Q5 Min PRN Umer Whitman, DO        fentaNYL (SUBLIMAZE) injection 25 mcg  25 mcg IntraVENous Q5 Min PRN Particia Rosanna, DO        HYDROmorphone HCl PF (DILAUDID) injection 0.5 mg  0.5 mg IntraVENous Q5 Min PRN Particia Rosanna, DO   0.5 mg at 10/27/21 1108    oxyCODONE-acetaminophen (PERCOCET) 5-325 MG per tablet 1 tablet  1 tablet Oral PRN Particia Rosanna, DO        Or    oxyCODONE-acetaminophen (PERCOCET) 5-325 MG per tablet 2 tablet  2 tablet Oral PRN Particia Rosanna, DO        ondansetron [facility]) injection 4 mg  4 mg IntraVENous Once PRN Umer Whitman, DO        promethazine (PHENERGAN) injection 6.25 mg  6.25 mg IntraVENous Once PRN Particia Rosanna, DO        labetalol (NORMODYNE;TRANDATE) injection 5 mg  5 mg IntraVENous Q10 Min PRN Particia Rosanna, DO        hydrALAZINE (APRESOLINE) injection 5 mg  5 mg IntraVENous Q10 Min PRN Umer Whitman, DO        bupivacaine-EPINEPHrine (MARCAINE-w/EPINEPHRINE) 0.25% -1:361545 60 mL, ketorolac (TORADOL) 30 mg    PRN Escobar Funez MD   30 mL at 10/27/21 1054       Allergies:     Allergies   Allergen Reactions    Fentanyl Shortness Of Breath    Cherry     Ezetimibe-Simvastatin Other (See Comments)     Unsure reaction         Problem List:    Patient Active Problem List   Diagnosis Code    COPD without exacerbation ([facility] J44.9    Hyperlipidemia E78.5    Cardiomyopathy, nonischemic (HCC) I42.8    A-fib ([facility] I48.91    Arrhythmia I49.9    Disturbance of sleep G47.9    Abnormal EKG R94.31    Obesity E66.9     Alcohol use: Yes     Alcohol/week: 0.0 standard drinks     Comment: 5 glasses of wine/weekly                                Counseling given: Not Answered      Vital Signs (Current):   Vitals:    10/27/21 1100 10/27/21 1115 10/27/21 1130 10/27/21 1145   BP: 134/74 136/73 136/62 129/76   Pulse: 75 74 72 74   Resp: 11 10 11 9   Temp:       TempSrc:       SpO2: 96% 98% 99% 99%   Weight:       Height:                                                  BP Readings from Last 3 Encounters:   10/27/21 129/76   10/15/21 (!) 153/80   07/27/21 (!) 142/84       NPO Status: Time of last liquid consumption: 2000                        Time of last solid consumption: 1900                        Date of last liquid consumption: 10/26/21                        Date of last solid food consumption: 10/26/21    BMI:   Wt Readings from Last 3 Encounters:   10/27/21 194 lb (88 kg)   10/15/21 194 lb (88 kg)   07/27/21 199 lb (90.3 kg)     Body mass index is 30.38 kg/m².     CBC:   Lab Results   Component Value Date    WBC 7.3 10/18/2021    RBC 3.90 10/18/2021    HGB 13.7 10/18/2021    HCT 42.8 10/18/2021    .7 10/18/2021    RDW 14.0 10/18/2021     10/18/2021       CMP:   Lab Results   Component Value Date     10/18/2021    K 5.4 10/18/2021    CL 94 10/18/2021    CO2 22 10/18/2021    BUN 17 10/18/2021    CREATININE 1.12 10/18/2021    GFRAA >60 10/18/2021    LABGLOM >60 10/18/2021    GLUCOSE 148 10/18/2021    GLUCOSE 145 04/11/2012    PROT 7.1 10/18/2021    CALCIUM 9.2 10/18/2021    BILITOT 0.50 10/18/2021    ALKPHOS 75 10/18/2021    AST 41 10/18/2021    ALT 42 10/18/2021       POC Tests:   Recent Labs     10/27/21  1033   POCGLU 156*       Coags: No results found for: PROTIME, INR, APTT    HCG (If Applicable): No results found for: PREGTESTUR, PREGSERUM, HCG, HCGQUANT     ABGs: No results found for: PHART, PO2ART, DFQ6GRR, SIO6AMG, BEART, Z9XMUMFG     Type & Screen (If Applicable):  No results found for: LABABO, [address]    Drug/Infectious Status (If Applicable):  No results found for: HIV, HEPCAB    COVID-19 Screening (If Applicable): No results found for: COVID19        Anesthesia Evaluation  Patient summary reviewed and Nursing notes reviewed no history of anesthetic complications:   Airway: Mallampati: II  TM distance: >3 FB   Neck ROM: full  Mouth opening: > = 3 FB Dental: normal exam         Pulmonary:normal exam    (+) COPD:  sleep apnea:                             Cardiovascular:  Exercise tolerance: no interval change,   (+) hypertension:, CAD:, CHF:,           Rate: normal      Cleared by cardiology              Neuro/Psych:               GI/Hepatic/Renal:             Endo/Other:    (+) Diabetes, : arthritis:., .                 Abdominal:             Vascular: Other Findings:             Anesthesia Plan      general     ASA 3       Induction: intravenous. MIPS: Postoperative opioids intended and Prophylactic antiemetics administered. Anesthetic plan and risks discussed with patient. Plan discussed with CRNA.     Attending anesthesiologist reviewed and agrees with Preprocedure content              Nahed Leone DO   10/27/2021

## 2021-10-27 NOTE — PLAN OF CARE
Problem: Pain:  Goal: Pain level will decrease  Description: Pain level will decrease  Outcome: Ongoing  Goal: Control of acute pain  Description: Control of acute pain  Outcome: Ongoing  Note: Pain level assessment complete. Patient educated on pain scale and control interventions  PRN pain medication given per patient request  Patient instructed to call out with new onset of pain or unrelieved pain    Goal: Control of chronic pain  Description: Control of chronic pain  Outcome: Ongoing     Problem: Falls - Risk of:  Goal: Will remain free from falls  Description: Will remain free from falls  Outcome: Ongoing  Note: Siderails up x 2  Hourly rounding  Call light in reach  Instructed to call for assist before attempting out of bed.   Remains free from falls and accidental injury at this time   Floor free from obstacles  Bed is locked and in lowest position  Adequate lighting provided  Bed alarm on, Red Falling star and Stay with Me signs posted      Goal: Absence of physical injury  Description: Absence of physical injury  Outcome: Ongoing
for assist before attempting out of bed.   Remains free from falls and accidental injury at this time   Floor free from obstacles  Bed is locked and in lowest position  Adequate lighting provided  Bed alarm on, Red Falling star and Stay with Me signs posted      Goal: Absence of physical injury  Description: Absence of physical injury  10/27/2021 1734 by Ary Albarado, RN  Outcome: Completed  10/27/2021 1253 by Ary Albarado RN  Outcome: Ongoing     Problem: IP BALANCE  Goal: LTG - Patient will maintain balance to allow for safe/functional mobility  Outcome: Completed

## 2021-10-27 NOTE — PROGRESS NOTES
Physical Therapy    Facility/Department: [facility] MED SURG  Initial Assessment    NAME: Nissa Fuentes  : 1955  MRN: [id number]    Date of Service: 10/27/2021    Discharge Recommendations:  Patient would benefit from continued therapy after discharge     Due to recent hospitalization and medical condition, pt would benefit from additional therapy at time of discharge to ensure safety. Please refer to the AM-PAC score for current functional status. Assessment   Body structures, Functions, Activity limitations: Decreased functional mobility ; Decreased safe awareness;Decreased balance;Decreased strength;Decreased ROM; Decreased ADL status  Assessment: Patient demo decreased gait, transfers, bed mobility, and decreased quad control. Patient will benefit from skilled PT to return to PLOF. Patient with increased fall risk due to impulsivity and decreased quad control. Prognosis: Good  Decision Making: Medium Complexity  PT Education: Goals;PT Role;Plan of Care;Transfer Training;General Safety;Home Exercise Program;Disease Specific Education;Gait Training  Patient Education: Patient educated to general safety, fall prevention, use of RW, and written HEP. Patient verbalized understanding. REQUIRES PT FOLLOW UP: Yes  Activity Tolerance  Activity Tolerance: Patient Tolerated treatment well       Patient Diagnosis(es): The encounter diagnosis was S/P total right hip arthroplasty. has a past medical history of A-fib ([address] Abnormal EKG, Arrhythmia, Arthritis, CAD (coronary artery disease), Caffeine use, Cardiomyopathy ([address] Cardiomyopathy, nonischemic ([address] CHF (congestive heart failure) ([address] COPD (chronic obstructive pulmonary disease) ([address] Diabetes mellitus ([address] Disturbance of sleep, ED (erectile dysfunction), Hyperlipidemia, Hypertension, Left atrial enlargement, Mild pulmonary hypertension ([address] Obesity, YOLY (obstructive sleep apnea), and Rheumatoid arthritis ([address]    has a past surgical history that includes Colonoscopy; fracture surgery; Cardiac surgery (2014 & 2016); Cardioversion (2005, 2014, 2016); Cardiac catheterization (10/2004, 04/2014); Total hip arthroplasty (Left, 01/27/2020); Total hip arthroplasty (Left, 1/27/2020); and Total hip arthroplasty (Right, 10/27/2021). Restrictions  Restrictions/Precautions  Restrictions/Precautions: Weight Bearing, General Precautions, Fall Risk, Up as Tolerated  Lower Extremity Weight Bearing Restrictions  Right Lower Extremity Weight Bearing: Weight Bearing As Tolerated  Position Activity Restriction  Other position/activity restrictions: elevate op extremity, up with assist, R UE IV  Vision/Hearing        Subjective  General  Chart Reviewed: Yes  Patient assessed for rehabilitation services?: Yes  Additional Pertinent Hx: RA, BMI 30, Left  MASON 1/2020,  Response To Previous Treatment: Not applicable  Diagnosis: Right anterior MASON  Follows Commands: Within Functional Limits  General Comment  Comments: RN reports patient medically appropriate for PT. Subjective  Subjective: Patient agreeable to PT. Patient talks extensively about government conspiracies and taxes.   Pain Screening  Patient Currently in Pain: Yes     Pre Treatment Pain Screening  Intervention List: Patient able to continue with treatment    Orientation  Orientation  Overall Orientation Status: Within Functional Limits  Social/Functional History  Social/Functional History  Lives With: Son (22 year old goes to school full time)  Type of Home: House  Home Layout: One level (Pt reports he goes to his basement to party full flight with handrail)  Home Access: Stairs to enter with rails  Entrance Stairs - Number of Steps: 5-6  Entrance Stairs - Rails: Both  Bathroom Shower/Tub: Walk-in shower  Bathroom Toilet: Standard  Bathroom Equipment: Toilet raiser, Built-in shower seat, Grab bars in [facility] Juan Mcelroy Conifer: Rolling walker Cane  ADL Assistance: [address]: Independent  Homemaking Responsibilities: Yes  Ambulation Assistance: Independent  Transfer Assistance: Independent  Active : Yes  Occupation: Retired  Type of occupation: [address]: \"party\" \"I do whatever I want since my wife passed, the real Jacky Martínez is back\"  Additional Comments: Pt reports he had two fall ~ 1 year ago falling off a stool in the basement and one when cleaning his tub. No recent falls  Cognition   Cognition  Overall Cognitive Status: Exceptions  Arousal/Alertness: Appropriate responses to stimuli  Following Commands: Follows multistep commands with repitition; Follows multistep commands with increased time  Attention Span: Attends with cues to redirect  Memory: Appears intact  Safety Judgement: Decreased awareness of need for safety;Decreased awareness of need for assistance  Problem Solving: Decreased awareness of errors;Assistance required to identify errors made;Assistance required to generate solutions;Assistance required to implement solutions;Assistance required to correct errors made  Insights: Decreased awareness of deficits  Initiation: Does not require cues  Sequencing: Requires cues for some  Cognition Comment: Pt with tangential speech throughout session needing constant redirection. Objective     Observation/Palpation  Posture: Good  Observation: Patient moves quickly / impulsively, requires cues to move more slowly / cautiously. AROM RLE (degrees)  RLE General AROM: hip and knee 0-90, ankle WFL  AROM LLE (degrees)  LLE AROM : WNL  Strength RLE  Comment: hip 3-/5, knee 4-/5, ankle 5/5  Strength LLE  Strength LLE: WFL  Tone RLE  RLE Tone: Normotonic  Tone LLE  LLE Tone: Normotonic  Motor Control  Gross Motor?: WFL     Bed mobility  Supine to Sit: Minimal assistance (Simultaneous filing. User may not have seen previous data.)  Sit to Supine: Minimal assistance (Simultaneous filing.  User may not have seen previous data.)  Scooting: Supervision (Simultaneous filing. User may not have seen previous data.)  Comment: Patient initially min assist with bed mobility, but SBA after education and practice. Patient initially crossing LEs to use Left LE to hook Right foot to move in bed, explained to patient it is better to not cross legs if he can get OOB without hooking leg. Patient was then able to move RLE to get OOB. (Simultaneous filing. User may not have seen previous data.)  Transfers  Sit to Stand: Minimal Assistance  Stand to sit: Minimal Assistance  Bed to Chair: Minimal assistance  Stand Pivot Transfers: Minimal Assistance  Comment: RW with transfers, min assist initially, then CGA with education and practice. Patient's impulsivity does increase fall risk, patient educated to try and move more slowly. Ambulation  Ambulation?: Yes  Ambulation 1  Surface: level tile  Device: Rolling Walker  Assistance: Minimal assistance  Quality of Gait: Patient impulsive with gait, verbal cues for safety and to slow down. Mild decreased quad control on right, but no sign of knee buckling. Patient able to AMB with CGA after education and practice. Gait Deviations: Increased ROBSON  Distance: 50 feet  Stairs/Curb  Stairs?: Yes  Stairs  # Steps : 5  Rails: Bilateral  Assistance: Minimal assistance  Comment: Min assist initially, then CGA. Max verbal cues for sequencing and safety.      Balance  Sitting - Static: Good  Sitting - Dynamic: Good  Standing - Static: Fair;+ (RW)  Standing - Dynamic: Fair;+ (RW)  Exercises  Quad Sets: 10x  Heelslides: 5x  Gluteal Sets: 10x  Hip Abduction: 5x  Knee Long Arc Quad: 5x  Ankle Pumps: 10x  Comments: Patient assisted with hip abduction and heel slides     Plan   Plan  Times per week: 2x/d, 7 d/wk  Current Treatment Recommendations: Strengthening, Balance Training, Functional Mobility Training, Transfer Training, Stair training, Gait Training, Endurance Training, Patient/Caregiver Education & Training, Home Exercise Program, Safety Education & Training  Safety Devices  Type of devices: All fall risk precautions in place, Gait belt, Nurse notified, Left in bed, Call light within reach, Bed alarm in place    OutComes Score    AM-PAC Score  AM-PAC Inpatient Mobility Raw Score : 18 (10/27/21 1456)  AM-PAC Inpatient T-Scale Score : 43.63 (10/27/21 1456)  Mobility Inpatient CMS 0-100% Score: 46.58 (10/27/21 1456)  Mobility Inpatient CMS G-Code Modifier : CK (10/27/21 1456)          Goals  Short term goals  Time Frame for Short term goals: 2 visits  Short term goal 1: Patient will be indep with bed mobility and transfers. Short term goal 2: Patient will amb 200 feet with RW and indep. Short term goal 3: Patient will negotiate 5 stairs with rails and indep. Short term goal 4: Patient will be indep with HEP. Patient Goals   Patient goals : Return home     Co-treatment with OT warranted first time up day of surgery. Cotx due to potential risk of decreased sensation, muscle control and proprioception from spinal epidural and/or regional block. Decreased safety and independence requiring 2 skilled therapy professionals to address individual discipline's goals.    Therapy Time   Individual Concurrent Group Co-treatment   Time In 1313         Time Out 1417         Minutes 64         Timed Code Treatment Minutes: [address] Tylor Veliz, PT

## 2021-10-27 NOTE — ANESTHESIA POSTPROCEDURE EVALUATION
Department of Anesthesiology  Postprocedure Note    Patient: Susy Pires  MRN: [id number]  YOB: 1955  Date of evaluation: 10/27/2021  Time:  11:49 AM     Procedure Summary     Date: 10/27/21 Room / Location: [place] [address]    Anesthesia Start: 9385 Anesthesia Stop: 5668    Procedure: RIGHT HIP TOTAL ARTHROPLASTY ANTERIOR APPROACH   BONNY (Right ) Diagnosis: (DX OA RIGHT HIP)    Surgeons: Tanner Hayden MD Responsible Provider: Elin Motnesinos DO    Anesthesia Type: general ASA Status: 3          Anesthesia Type: No value filed. Chencho Phase I: Chencho Score: 9    Chencho Phase II:      Last vitals: Reviewed and per EMR flowsheets.        Anesthesia Post Evaluation    Patient location during evaluation: PACU  Patient participation: complete - patient participated  Level of consciousness: awake and alert  Airway patency: patent  Nausea & Vomiting: no nausea and no vomiting  Complications: no  Cardiovascular status: hemodynamically stable  Respiratory status: acceptable  Hydration status: stable

## 2021-10-31 LAB
ABO/RH: NORMAL
ANTIBODY IDENTIFICATION: NORMAL
ANTIBODY SCREEN: POSITIVE
ARM BAND NUMBER: NORMAL
BLD PROD TYP BPU: NORMAL
BLD PROD TYP BPU: NORMAL
CROSSMATCH RESULT: NORMAL
CROSSMATCH RESULT: NORMAL
DAT, POLYSPECIFIC: NEGATIVE
DISPENSE STATUS BLOOD BANK: NORMAL
DISPENSE STATUS BLOOD BANK: NORMAL
EXPIRATION DATE: NORMAL
TRANSFUSION STATUS: NORMAL
TRANSFUSION STATUS: NORMAL
UNIT DIVISION: 0
UNIT DIVISION: 0
UNIT NUMBER: NORMAL
UNIT NUMBER: NORMAL

## 2022-02-01 ENCOUNTER — HOSPITAL ENCOUNTER (OUTPATIENT)
Age: 67
Setting detail: SPECIMEN
Discharge: HOME | End: 2022-02-01

## 2022-02-01 LAB
ALBUMIN SERPL-MCNC: 4.7 G/DL (ref 3.5–5.2)
ALBUMIN/GLOBULIN RATIO: 1.7 (ref 1–2.5)
ALP BLD-CCNC: 98 U/L (ref 40–129)
ALT SERPL-CCNC: 27 U/L (ref 5–41)
ANION GAP SERPL CALCULATED.3IONS-SCNC: 16 MMOL/L (ref 9–17)
AST SERPL-CCNC: 29 U/L
BILIRUB SERPL-MCNC: 0.59 MG/DL (ref 0.3–1.2)
BUN BLDV-MCNC: 16 MG/DL (ref 8–23)
BUN/CREAT BLD: ABNORMAL (ref 9–20)
CALCIUM SERPL-MCNC: 9.6 MG/DL (ref 8.6–10.4)
CHLORIDE BLD-SCNC: 95 MMOL/L (ref 98–107)
CHOLESTEROL/HDL RATIO: 1.8
CHOLESTEROL: 200 MG/DL
CO2: 25 MMOL/L (ref 20–31)
CREAT SERPL-MCNC: 1.06 MG/DL (ref 0.7–1.2)
CREATININE URINE: 246.6 MG/DL (ref 39–259)
GFR AFRICAN AMERICAN: >60 ML/MIN
GFR NON-AFRICAN AMERICAN: >60 ML/MIN
GFR SERPL CREATININE-BSD FRML MDRD: ABNORMAL ML/MIN/{1.73_M2}
GFR SERPL CREATININE-BSD FRML MDRD: ABNORMAL ML/MIN/{1.73_M2}
GLUCOSE BLD-MCNC: 134 MG/DL (ref 70–99)
HDLC SERPL-MCNC: 113 MG/DL
LDL CHOLESTEROL: 75 MG/DL (ref 0–130)
MICROALBUMIN/CREAT 24H UR: 20 MG/L
MICROALBUMIN/CREAT UR-RTO: 8 MCG/MG CREAT
POTASSIUM SERPL-SCNC: 5 MMOL/L (ref 3.7–5.3)
SODIUM BLD-SCNC: 136 MMOL/L (ref 135–144)
TOTAL PROTEIN: 7.5 G/DL (ref 6.4–8.3)
TRIGL SERPL-MCNC: 61 MG/DL
VLDLC SERPL CALC-MCNC: ABNORMAL MG/DL (ref 1–30)

## 2022-06-03 DIAGNOSIS — M15.9 OSTEOARTHRITIS OF MULTIPLE JOINTS, UNSPECIFIED OSTEOARTHRITIS TYPE: ICD-10-CM

## 2022-07-15 ENCOUNTER — OFFICE VISIT (OUTPATIENT)
Dept: FAMILY MEDICINE CLINIC | Age: 67
End: 2022-07-15
Payer: MEDICARE

## 2022-07-15 VITALS
WEIGHT: 204 LBS | DIASTOLIC BLOOD PRESSURE: 86 MMHG | BODY MASS INDEX: 31.95 KG/M2 | SYSTOLIC BLOOD PRESSURE: 138 MMHG | OXYGEN SATURATION: 94 % | TEMPERATURE: 98.6 F | HEART RATE: 89 BPM

## 2022-07-15 DIAGNOSIS — M15.9 OSTEOARTHRITIS OF MULTIPLE JOINTS, UNSPECIFIED OSTEOARTHRITIS TYPE: ICD-10-CM

## 2022-07-15 DIAGNOSIS — Z13.220 LIPID SCREENING: ICD-10-CM

## 2022-07-15 DIAGNOSIS — I10 ESSENTIAL HYPERTENSION: ICD-10-CM

## 2022-07-15 DIAGNOSIS — R97.20 ELEVATED PSA: ICD-10-CM

## 2022-07-15 DIAGNOSIS — R46.89 ARGUMENTATIVE BEHAVIOR: Primary | ICD-10-CM

## 2022-07-15 DIAGNOSIS — I48.0 PAROXYSMAL ATRIAL FIBRILLATION (HCC): ICD-10-CM

## 2022-07-15 DIAGNOSIS — M06.9 RHEUMATOID ARTHRITIS, INVOLVING UNSPECIFIED SITE, UNSPECIFIED WHETHER RHEUMATOID FACTOR PRESENT (HCC): ICD-10-CM

## 2022-07-15 DIAGNOSIS — J44.9 COPD WITHOUT EXACERBATION (HCC): ICD-10-CM

## 2022-07-15 DIAGNOSIS — E11.9 TYPE 2 DIABETES MELLITUS WITHOUT COMPLICATION, WITHOUT LONG-TERM CURRENT USE OF INSULIN (HCC): ICD-10-CM

## 2022-07-15 PROCEDURE — 1036F TOBACCO NON-USER: CPT | Performed by: NURSE PRACTITIONER

## 2022-07-15 PROCEDURE — 3017F COLORECTAL CA SCREEN DOC REV: CPT | Performed by: NURSE PRACTITIONER

## 2022-07-15 PROCEDURE — 2022F DILAT RTA XM EVC RTNOPTHY: CPT | Performed by: NURSE PRACTITIONER

## 2022-07-15 PROCEDURE — 99213 OFFICE O/P EST LOW 20 MIN: CPT | Performed by: NURSE PRACTITIONER

## 2022-07-15 PROCEDURE — G8417 CALC BMI ABV UP PARAM F/U: HCPCS | Performed by: NURSE PRACTITIONER

## 2022-07-15 PROCEDURE — 1123F ACP DISCUSS/DSCN MKR DOCD: CPT | Performed by: NURSE PRACTITIONER

## 2022-07-15 PROCEDURE — G8427 DOCREV CUR MEDS BY ELIG CLIN: HCPCS | Performed by: NURSE PRACTITIONER

## 2022-07-15 PROCEDURE — 3023F SPIROM DOC REV: CPT | Performed by: NURSE PRACTITIONER

## 2022-07-15 PROCEDURE — 3046F HEMOGLOBIN A1C LEVEL >9.0%: CPT | Performed by: NURSE PRACTITIONER

## 2022-07-15 ASSESSMENT — ENCOUNTER SYMPTOMS
BACK PAIN: 0
ABDOMINAL PAIN: 0
VOMITING: 0
DIARRHEA: 0
COUGH: 0
NAUSEA: 0
SHORTNESS OF BREATH: 0
SINUS PAIN: 0
EYE PAIN: 0
SORE THROAT: 0

## 2022-07-15 NOTE — PATIENT INSTRUCTIONS
starts to cause symptoms. You have most symptoms of type 2 diabeteswhen your blood sugar is either too high or too low. The most common symptoms of high blood sugar include: Thirst.  Needing to urinate often. Weight loss. Blurry vision. The symptoms of low blood sugar include:  Sweating. Shakiness. Weakness. Hunger. Confusion. You're not likely to get symptoms of low blood sugar unless you take insulin oruse certain diabetes medicines that lower blood sugar. How can you help prevent type 2 diabetes? There are things you can do to help prevent type 2 diabetes. Stay at a healthy weight. Exercise regularly, and eat healthy foods. Even small changes can make a difference. If you have prediabetes, the medicine metformin can help preventtype 2 diabetes. How is type 2 diabetes treated? Treatment for type 2 diabetes will change over time to meet your needs. But the focus of your treatment will usually be to keep your blood sugar levels in your target range. This will help prevent problems such as eye, kidney, heart, bloodvessel, and nerve disease. Some people may need medicines to help their bodies make insulin or decrease insulin resistance. Some medicines slow down how quickly the body absorbscarbohydrates. Treatment to manage type 2 diabetes includes:  Making healthy food choices and being active. Losing weight, if you need to. Seeing your doctor regularly. Keeping your blood sugar in your target range. Taking medicines, if you need them. Quitting smoking, if you smoke. Keeping your blood pressure and cholesterol under control. Follow-up care is a key part of your treatment and safety. Be sure to make and go to all appointments, and call your doctor if you are having problems. It's also a good idea to know your test results and keep alist of the medicines you take. Where can you learn more? Go to https://jack.[employer]. org and sign in to your "[employer] account.  Enter M039 in the Search Health Information box to learn more about \"Learning About Type 2 Diabetes. \"     If you do not have an account, please click on the \"Sign Up Now\" link. Current as of: July 28, 2021               Content Version: 13.3  © 8392-2490 [employer] Inc[employer]. Care instructions adapted under license by [facility] ([facility]). If you have questions about a medical condition or this instruction, always ask your healthcare professional. [address] any warranty or liability for your use of this information.

## 2022-07-15 NOTE — PROGRESS NOTES
72 tablet 0    aspirin 325 MG EC tablet Take 1 tablet by mouth 2 times daily 70 tablet 0    famotidine (PEPCID) 20 MG tablet Take 1 tablet by mouth 2 times daily 70 tablet 0    amiodarone (CORDARONE) 200 MG tablet Take 400 mg by mouth daily      Dulaglutide 1.5 MG/0.5ML SOPN Inject 3 mg into the skin once a week Saturday       nateglinide (STARLIX) 60 MG tablet Take 60 mg by mouth 3 times daily (before meals)       lisinopril (PRINIVIL;ZESTRIL) 5 MG tablet Take 5 mg by mouth daily        No current facility-administered medications for this visit. Allergies   Allergen Reactions    Fentanyl Shortness Of Breath    Cherry     Ezetimibe-Simvastatin Other (See Comments)     Unsure reaction         Subjective:      Review of Systems   Constitutional:  Negative for chills and fatigue. HENT:  Negative for congestion, ear pain, sinus pain and sore throat. Eyes:  Negative for pain and visual disturbance. Respiratory:  Negative for cough and shortness of breath. Cardiovascular:  Negative for chest pain and palpitations. Gastrointestinal:  Negative for abdominal pain, diarrhea, nausea and vomiting. Genitourinary:  Negative for penile pain and testicular pain. Musculoskeletal:  Positive for arthralgias. Negative for back pain, joint swelling and neck pain. Skin:  Negative for rash. Neurological:  Negative for dizziness and light-headedness. Hematological:  Does not bruise/bleed easily. All other systems reviewed and are negative.    :Objective     Physical Exam  Vitals and nursing note reviewed. Constitutional:       General: He is not in acute distress. Appearance: Normal appearance. He is not toxic-appearing. Cardiovascular:      Rate and Rhythm: Normal rate. Pulmonary:      Effort: Pulmonary effort is normal.      Breath sounds: Normal breath sounds. Skin:     General: Skin is warm and dry. Neurological:      General: No focal deficit present.       Mental Status: He is alert and oriented to person, place, and time. /86   Pulse 89   Temp 98.6 °F (37 °C) (Tympanic)   Wt 204 lb (92.5 kg)   SpO2 94%   BMI 31.95 kg/m²     Lab Review   Hospital Outpatient Visit on 02/01/2022   Component Date Value    Glucose 02/01/2022 134 (A)    BUN 02/01/2022 16     CREATININE 02/01/2022 1.06     Bun/Cre Ratio 02/01/2022 NOT REPORTED     Calcium 02/01/2022 9.6     Sodium 02/01/2022 136     Potassium 02/01/2022 5.0     Chloride 02/01/2022 95 (A)    CO2 02/01/2022 25     Anion Gap 02/01/2022 16     Alkaline Phosphatase 02/01/2022 98     ALT 02/01/2022 27     AST 02/01/2022 29     Total Bilirubin 02/01/2022 0.59     Total Protein 02/01/2022 7.5     Albumin 02/01/2022 4.7     Albumin/Globulin Ratio 02/01/2022 1.7     GFR Non- 02/01/2022 >60     GFR  02/01/2022 >60     GFR Comment 02/01/2022          GFR Staging 02/01/2022 NOT REPORTED     Cholesterol 02/01/2022 200 (A)    HDL 02/01/2022 113     LDL Cholesterol 02/01/2022 75     Chol/HDL Ratio 02/01/2022 1.8     Triglycerides 02/01/2022 61     VLDL 02/01/2022 NOT REPORTED     Microalb, Ur 02/01/2022 20     Creatinine, Ur 02/01/2022 246.6     Microalb/Crt. Ratio 02/01/2022 8        Assessment and Plan      1. Type 2 diabetes mellitus without complication, without long-term current use of insulin (HCC)  -     CBC with Auto Differential; Future  2. Essential hypertension  -     TSH With Reflex Ft4; Future  -     CBC with Auto Differential; Future  3. Paroxysmal atrial fibrillation (HCC)  -     CBC with Auto Differential; Future  4. COPD without exacerbation ([facility]  -     CBC with Auto Differential; Future  5. Elevated PSA  -     PSA Screening; Future  -     CBC with Auto Differential; Future  6.  Rheumatoid arthritis, involving unspecified site, unspecified whether rheumatoid factor present ([facility]  -     Clair Acuna MD, Rheumatology, [place]  -     CBC with Auto Differential; Future  -     Comprehensive Metabolic Panel; Future  7. Lipid screening  -     CBC with Auto Differential; Future  8. Osteoarthritis of multiple joints, unspecified osteoarthritis type  -     CBC with Auto Differential; Future  -     methotrexate (RHEUMATREX) 2.5 MG chemo tablet; Take 3 tablets twice weekly, Disp-72 tablet, R-0Normal     Pt was very rude and argumentative, constantly talking politics  Labs ordered  Referral to rheumatology            No results found for this visit on 07/15/22. Return if symptoms worsen or fail to improve. Orders Placed This Encounter   Medications    methotrexate (RHEUMATREX) 2.5 MG chemo tablet     Sig: Take 3 tablets twice weekly     Dispense:  72 tablet     Refill:  0        Patient given educational materials - see patient instructions. Discussed use, benefit, and side effects of prescribed medications. All patientquestions answered. Pt voiced understanding. Patient given educational materials - see patient instructions. Discussed use, benefit, and side effects of prescribed medications. All patientquestions answered. Pt voiced understanding. This note was transcribed using dictation with Dragon services. Efforts were made to correct any errors but some words may be misinterpreted.     Patient assumes risks associated with failure to complete recommended testing and treatments in a timely manner    Electronically signed by PIPER Gordon CNP on 7/15/2022at 3:58 PM

## 2022-09-19 DIAGNOSIS — M15.9 OSTEOARTHRITIS OF MULTIPLE JOINTS, UNSPECIFIED OSTEOARTHRITIS TYPE: ICD-10-CM

## 2022-09-19 RX ORDER — METHOTREXATE 2.5 MG/1
TABLET ORAL
Qty: 72 TABLET | Refills: 3 | OUTPATIENT
Start: 2022-09-19

## 2022-11-01 ENCOUNTER — HOSPITAL ENCOUNTER (OUTPATIENT)
Age: 67
Setting detail: SPECIMEN
Discharge: HOME | End: 2022-11-01

## 2022-11-01 LAB
ALBUMIN SERPL-MCNC: 4.3 G/DL (ref 3.5–5.2)
ALBUMIN/GLOBULIN RATIO: 1.3 (ref 1–2.5)
ALP BLD-CCNC: 93 U/L (ref 40–129)
ALT SERPL-CCNC: 27 U/L (ref 5–41)
ANION GAP SERPL CALCULATED.3IONS-SCNC: 12 MMOL/L (ref 9–17)
AST SERPL-CCNC: 33 U/L
BILIRUB SERPL-MCNC: 0.5 MG/DL (ref 0.3–1.2)
BUN BLDV-MCNC: 16 MG/DL (ref 8–23)
CALCIUM SERPL-MCNC: 8.9 MG/DL (ref 8.6–10.4)
CHLORIDE BLD-SCNC: 96 MMOL/L (ref 98–107)
CHOLESTEROL/HDL RATIO: 1.9
CHOLESTEROL: 182 MG/DL
CO2: 28 MMOL/L (ref 20–31)
CREAT SERPL-MCNC: 1.14 MG/DL (ref 0.7–1.2)
CREATININE URINE: 346.4 MG/DL (ref 39–259)
GFR SERPL CREATININE-BSD FRML MDRD: >60 ML/MIN/1.73M2
GLUCOSE BLD-MCNC: 162 MG/DL (ref 70–99)
HDLC SERPL-MCNC: 94 MG/DL
LDL CHOLESTEROL: 75 MG/DL (ref 0–130)
MICROALBUMIN/CREAT 24H UR: 34 MG/L
MICROALBUMIN/CREAT UR-RTO: 10 MCG/MG CREAT
POTASSIUM SERPL-SCNC: 4.3 MMOL/L (ref 3.7–5.3)
SODIUM BLD-SCNC: 136 MMOL/L (ref 135–144)
TOTAL PROTEIN: 7.5 G/DL (ref 6.4–8.3)
TRIGL SERPL-MCNC: 63 MG/DL

## 2023-02-20 ENCOUNTER — COMMUNITY OUTREACH (OUTPATIENT)
Dept: FAMILY MEDICINE CLINIC | Age: 68
End: 2023-02-20

## 2024-01-03 ENCOUNTER — OFFICE VISIT (OUTPATIENT)
Dept: FAMILY MEDICINE CLINIC | Age: 69
End: 2024-01-03
Payer: MEDICARE

## 2024-01-03 VITALS
DIASTOLIC BLOOD PRESSURE: 88 MMHG | WEIGHT: 205.8 LBS | TEMPERATURE: 99.2 F | OXYGEN SATURATION: 97 % | HEART RATE: 88 BPM | HEIGHT: 67 IN | BODY MASS INDEX: 32.3 KG/M2 | SYSTOLIC BLOOD PRESSURE: 138 MMHG

## 2024-01-03 DIAGNOSIS — R05.1 ACUTE COUGH: ICD-10-CM

## 2024-01-03 DIAGNOSIS — J40 BRONCHITIS: Primary | ICD-10-CM

## 2024-01-03 DIAGNOSIS — E66.09 CLASS 1 OBESITY DUE TO EXCESS CALORIES WITH SERIOUS COMORBIDITY AND BODY MASS INDEX (BMI) OF 32.0 TO 32.9 IN ADULT: ICD-10-CM

## 2024-01-03 DIAGNOSIS — E11.9 TYPE 2 DIABETES MELLITUS WITHOUT COMPLICATION, WITHOUT LONG-TERM CURRENT USE OF INSULIN (HCC): ICD-10-CM

## 2024-01-03 DIAGNOSIS — J44.1 CHRONIC OBSTRUCTIVE PULMONARY DISEASE WITH ACUTE EXACERBATION (HCC): ICD-10-CM

## 2024-01-03 PROBLEM — I45.10 RBBB: Status: ACTIVE | Noted: 2023-04-24

## 2024-01-03 PROBLEM — J44.9 COPD (CHRONIC OBSTRUCTIVE PULMONARY DISEASE) (HCC): Status: ACTIVE | Noted: 2023-04-24

## 2024-01-03 PROCEDURE — 99213 OFFICE O/P EST LOW 20 MIN: CPT | Performed by: FAMILY MEDICINE

## 2024-01-03 PROCEDURE — 1123F ACP DISCUSS/DSCN MKR DOCD: CPT | Performed by: FAMILY MEDICINE

## 2024-01-03 PROCEDURE — 3075F SYST BP GE 130 - 139MM HG: CPT | Performed by: FAMILY MEDICINE

## 2024-01-03 PROCEDURE — 3079F DIAST BP 80-89 MM HG: CPT | Performed by: FAMILY MEDICINE

## 2024-01-03 RX ORDER — METOPROLOL SUCCINATE 50 MG/1
TABLET, EXTENDED RELEASE ORAL
COMMUNITY
Start: 2023-10-05

## 2024-01-03 RX ORDER — BENZONATATE 200 MG/1
200 CAPSULE ORAL 3 TIMES DAILY PRN
Qty: 30 CAPSULE | Refills: 0 | Status: SHIPPED | OUTPATIENT
Start: 2024-01-03 | End: 2024-01-13

## 2024-01-03 RX ORDER — APIXABAN 5 MG/1
TABLET, FILM COATED ORAL
COMMUNITY
Start: 2023-10-05

## 2024-01-03 RX ORDER — ROSUVASTATIN CALCIUM 5 MG/1
5 TABLET, COATED ORAL DAILY
COMMUNITY
Start: 2022-12-28

## 2024-01-03 RX ORDER — ALBUTEROL SULFATE 90 UG/1
2 AEROSOL, METERED RESPIRATORY (INHALATION) EVERY 4 HOURS PRN
Qty: 18 G | Refills: 0 | Status: SHIPPED | OUTPATIENT
Start: 2024-01-03

## 2024-01-03 RX ORDER — DOXYCYCLINE HYCLATE 100 MG
100 TABLET ORAL 2 TIMES DAILY
Qty: 20 TABLET | Refills: 0 | Status: SHIPPED | OUTPATIENT
Start: 2024-01-03 | End: 2024-01-13

## 2024-01-03 RX ORDER — PREDNISONE 20 MG/1
TABLET ORAL
Qty: 21 TABLET | Refills: 0 | Status: SHIPPED | OUTPATIENT
Start: 2024-01-03

## 2024-01-03 SDOH — ECONOMIC STABILITY: FOOD INSECURITY: WITHIN THE PAST 12 MONTHS, THE FOOD YOU BOUGHT JUST DIDN'T LAST AND YOU DIDN'T HAVE MONEY TO GET MORE.: NEVER TRUE

## 2024-01-03 SDOH — ECONOMIC STABILITY: HOUSING INSECURITY
IN THE LAST 12 MONTHS, WAS THERE A TIME WHEN YOU DID NOT HAVE A STEADY PLACE TO SLEEP OR SLEPT IN A SHELTER (INCLUDING NOW)?: NO

## 2024-01-03 SDOH — ECONOMIC STABILITY: FOOD INSECURITY: WITHIN THE PAST 12 MONTHS, YOU WORRIED THAT YOUR FOOD WOULD RUN OUT BEFORE YOU GOT MONEY TO BUY MORE.: NEVER TRUE

## 2024-01-03 SDOH — ECONOMIC STABILITY: INCOME INSECURITY: HOW HARD IS IT FOR YOU TO PAY FOR THE VERY BASICS LIKE FOOD, HOUSING, MEDICAL CARE, AND HEATING?: NOT HARD AT ALL

## 2024-01-03 ASSESSMENT — PATIENT HEALTH QUESTIONNAIRE - PHQ9
2. FEELING DOWN, DEPRESSED OR HOPELESS: 0
SUM OF ALL RESPONSES TO PHQ QUESTIONS 1-9: 0
1. LITTLE INTEREST OR PLEASURE IN DOING THINGS: 0
SUM OF ALL RESPONSES TO PHQ QUESTIONS 1-9: 0
SUM OF ALL RESPONSES TO PHQ9 QUESTIONS 1 & 2: 0

## 2024-01-03 ASSESSMENT — ENCOUNTER SYMPTOMS
WHEEZING: 1
COUGH: 1
GASTROINTESTINAL NEGATIVE: 1
EYES NEGATIVE: 1
ALLERGIC/IMMUNOLOGIC NEGATIVE: 1
SHORTNESS OF BREATH: 1

## 2024-01-03 NOTE — PROGRESS NOTES
[facility]     Date of Visit:  1/3/2024  Patient Name: Gee Palacio   Patient :  1955     CHIEF COMPLAINT:       Gee Palacio is a 68 y.o. male who presents today for an general visit to be evaluated for the following condition(s):    Chief Complaint   Patient presents with    Bronchitis    Cough     Going on for a week. Son was sick prior. Been dizzy as well        HISTORY OF PRESENT ILLNESS:         Cough  This is a new problem. The current episode started 1 to 4 weeks ago. The problem has been waxing and waning. The problem occurs every few minutes. The cough is Productive of sputum. Associated symptoms include myalgias, shortness of breath and wheezing. Pertinent negatives include no chest pain, fever, rash or sweats. The symptoms are aggravated by cold air and lying down. He has tried nothing for the symptoms. His past medical history is significant for COPD.        REVIEW OF SYSTEMS:        Review of Systems   Constitutional:  Positive for fatigue. Negative for fever.   HENT:  Positive for congestion.    Eyes: Negative.    Respiratory:  Positive for cough, shortness of breath and wheezing.    Cardiovascular: Negative.  Negative for chest pain and palpitations.   Gastrointestinal: Negative.    Endocrine: Negative.    Genitourinary: Negative.    Musculoskeletal:  Positive for myalgias.   Skin: Negative.  Negative for rash.   Allergic/Immunologic: Negative.    Neurological: Negative.  Negative for weakness.   Hematological: Negative.    Psychiatric/Behavioral: Negative.          REVIEWED INFORMATION      Current Outpatient Medications   Medication Sig Dispense Refill    ELIQUIS 5 MG TABS tablet       metoprolol succinate (TOPROL XL) 50 MG extended release tablet       rosuvastatin (CRESTOR) 5 MG tablet Take 1 tablet by mouth daily      doxycycline hyclate (VIBRA-TABS) 100 MG tablet Take 1 tablet by mouth 2 times daily for 10 days 20 tablet 0    predniSONE (DELTASONE) 20 MG tablet 2

## 2024-01-03 NOTE — PROGRESS NOTES
Visit Information    Have you changed or started any medications since your last visit including any over-the-counter medicines, vitamins, or herbal medicines? no   Are you having any side effects from any of your medications? -  no  Have you stopped taking any of your medications? Is so, why? -  no    Have you seen any other physician or provider since your last visit? No  Have you had any other diagnostic tests since your last visit? No  Have you been seen in the emergency room and/or had an admission to a hospital since we last saw you? No  Have you had your routine dental cleaning in the past 6 months? no    Have you activated your [employer] account? If not, what are your barriers? Yes     Patient Care Team:  Duong Salazar APRN - CNP as PCP - General (Certified Nurse Practitioner)  Duong Salazar APRN - CNP as PCP - Empaneled Provider  Aydin Amin MD as Consulting Physician (Urology)    Medical History Review  Past Medical, Family, and Social History reviewed and does not contribute to the patient presenting condition    Health Maintenance   Topic Date Due    Depression Screen  Never done    Diabetic retinal exam  Never done    Shingles vaccine (1 of 2) Never done    Respiratory Syncytial Virus (RSV) Pregnant or age 60 yrs+ (1 - 1-dose 60+ series) Never done    Colorectal Cancer Screen  01/01/2016    Diabetic foot exam  11/29/2017    AAA screen  Never done    Flu vaccine (1) 08/01/2023    COVID-19 Vaccine (3 - 2023-24 season) 09/01/2023    Lipids  11/01/2023    GFR test (Diabetes, CKD 3-4, OR last GFR 15-59)  11/01/2023    Annual Wellness Visit (Medicare)  11/27/2023    Diabetic Alb to Cr ratio (uACR) test  01/13/2024    A1C test (Diabetic or Prediabetic)  01/31/2024    DTaP/Tdap/Td vaccine (2 - Td or Tdap) 12/29/2030    Pneumococcal 65+ years Vaccine  Completed    Hepatitis A vaccine  Aged Out    Hepatitis B vaccine  Aged Out    Hib vaccine  Aged Out    Polio vaccine  Aged Out

## 2024-12-06 ENCOUNTER — COMMUNITY OUTREACH (OUTPATIENT)
Dept: FAMILY MEDICINE CLINIC | Age: 69
End: 2024-12-06

## (undated) DEVICE — Z DISCONTINUED CATHETER BALLOON DIL BILI 0.035 IN 5 FRX180 CM MAXFORC [M00567410] [STRYKER CORP]

## (undated) DEVICE — GLOVE SURG SZ 85 L12IN FNGR THK79MIL GRN LTX FREE

## (undated) DEVICE — MARKER,SKIN,WI/RULER AND LABELS: Brand: MEDLINE

## (undated) DEVICE — DRAPE,U/ SHT,SPLIT,PLAS,STERIL: Brand: MEDLINE

## (undated) DEVICE — SUTURE 2-0 VCRL CTD FS-1 J443H

## (undated) DEVICE — SOLUTION IV IRRIG POUR BRL 0.9% SODIUM CHL 2F7124

## (undated) DEVICE — DRESSING TRNSPAR W4XL10IN FLM MIC POR SURESITE 123

## (undated) DEVICE — GLOVE SURG SZ 65 CRM LTX FREE POLYISOPRENE POLYMER BEAD ANTI

## (undated) DEVICE — GLOVE ORTHO 8   MSG9480

## (undated) DEVICE — 3M™ STERI-DRAPE™ U-DRAPE 1015: Brand: STERI-DRAPE™

## (undated) DEVICE — SUTURE ABSRB BRAID COAT UD CP NO 2 27IN VCRL J195H

## (undated) DEVICE — SYRINGE IRRIG 60ML SFT PLIABLE BLB EZ TO GRP 1 HND USE W/

## (undated) DEVICE — TOWEL,OR,DSP,ST,BLUE,DLX,XR,4/PK,20PK/CS: Brand: MEDLINE

## (undated) DEVICE — DRESSING TRNSPAR W8XL12IN FLM SURESITE 123

## (undated) DEVICE — Z DISCONTINUED USE 2717540 SUTURE ABSORBABLE MONOFILAMENT 3-0 RB 1 6 IN STRATAFIX SPRL

## (undated) DEVICE — SHEET, ORTHO, SPLIT, STERILE: Brand: MEDLINE

## (undated) DEVICE — BIT DRL L20MM DIA3.2MM DISP FOR G7 2 MOBILITY CONSTRUCT

## (undated) DEVICE — GLOVE SURG SZ 7 L12IN FNGR THK79MIL GRN LTX FREE

## (undated) DEVICE — SOLUTION IV 250ML 0.9% SOD CHL PH 5 INJ USP VIAFLX PLAS

## (undated) DEVICE — GLOVE SURG SZ 7 CRM LTX FREE POLYISOPRENE POLYMER BEAD ANTI

## (undated) DEVICE — CHLORAPREP 26ML ORANGE

## (undated) DEVICE — DRAPE C ARM UNIV W41XL74IN CLR PLAS XR VELC CLSR POLY STRP

## (undated) DEVICE — SUTURE STRATAFIX SPRL MCRYL + SZ 2-0 L18IN ABSRB UD CT-1 SXMP1B413

## (undated) DEVICE — COVER,MAYO STAND,XL,STERILE: Brand: MEDLINE

## (undated) DEVICE — STOCKINETTE,DOUBLE PLY,4X48,STERILE: Brand: MEDLINE

## (undated) DEVICE — COVER,TABLE,HEAVY DUTY,50"X90",STRL: Brand: MEDLINE

## (undated) DEVICE — ADHESIVE SKIN CLSR 0.7ML TOP DERMBND ADV

## (undated) DEVICE — YANKAUER,FLEXIBLE HANDLE,REGLR CAPACITY: Brand: MEDLINE INDUSTRIES, INC.

## (undated) DEVICE — STRYKER PERFORMANCE SERIES SAGITTAL BLADE: Brand: STRYKER PERFORMANCE SERIES

## (undated) DEVICE — PADDING CAST W6INXL4YD COT LO LINTING WYTEX

## (undated) DEVICE — SUTURE MCRYL SZ 4-0 L18IN ABSRB UD L16MM PC-3 3/8 CIR PRIM Y845G

## (undated) DEVICE — GLOVE SURG SZ 85 CRM LTX FREE POLYISOPRENE POLYMER BEAD ANTI

## (undated) DEVICE — SUTURE VCRL SZ 0 L36IN ABSRB UD L36MM CT-1 1/2 CIR J946H

## (undated) DEVICE — 4-PORT MANIFOLD: Brand: NEPTUNE 2

## (undated) DEVICE — ZIPPERED TOGA, 2X LARGE: Brand: FLYTE, SURGICOOL

## (undated) DEVICE — 6619 2 PTNT ISO SYS INCISE AREA&LT;(&GT;&&LT;)&GT;P: Brand: STERI-DRAPE™ IOBAN™ 2

## (undated) DEVICE — PAD,NON-ADHERENT,3X8,STERILE,LF,1/PK: Brand: MEDLINE

## (undated) DEVICE — BLANKET WRM W29.9XL79.1IN UP BODY FORC AIR MISTRAL-AIR

## (undated) DEVICE — STERILE PATIENT PROTECTIVE PAD FOR IMP® KNEE POSITIONERS & COHESIVE WRAP (10 / CASE): Brand: DE MAYO KNEE POSITIONER®

## (undated) DEVICE — SUTURE STRATAFIX SPRL SZ 3-0 L5IN ABSRB UD FS L26MM 3/8 CIR SXMP2B411

## (undated) DEVICE — 2T11 #2 PDO 36 X 36: Brand: 2T11 #2 PDO 36 X 36

## (undated) DEVICE — SPONGE LAP W18XL18IN WHT COT 4 PLY FLD STRUNG RADPQ DISP ST

## (undated) DEVICE — SUTURE STRATAFIX SPRL SZ 2-0 L9IN ABSRB VLT MH L36MM 1/2 SXPD2B408

## (undated) DEVICE — STOCKINETTE,IMPERVIOUS,12X48,STERILE: Brand: MEDLINE

## (undated) DEVICE — SUTURE MCRYL SZ 4-0 L27IN ABSRB UD L19MM PS-2 1/2 CIR PRIM Y426H

## (undated) DEVICE — C-ARM: Brand: UNBRANDED

## (undated) DEVICE — BIPOLAR SEALER 23-112-1 AQM 6.0: Brand: AQUAMANTYS ®

## (undated) DEVICE — BANDAGE COBAN 6 IN WND 6INX5YD FOAM

## (undated) DEVICE — SURGICAL PROCEDURE KIT BASIN MAJ SET UP

## (undated) DEVICE — ZIPPERED TOGA, X-LARGE: Brand: FLYTE

## (undated) DEVICE — BLADE ES L6IN ELASTOMERIC COAT EXT DURABLE BEND UPTO 90DEG

## (undated) DEVICE — TAPE,CLOTH/SILK,CURAD,3"X10YD,LF,40/CS: Brand: CURAD

## (undated) DEVICE — DRAPE,REIN 53X77,STERILE: Brand: MEDLINE

## (undated) DEVICE — DRAPE,T,LIMB,BILATERAL,STERILE: Brand: MEDLINE

## (undated) DEVICE — GOWN,SIRUS,NON REINFRCD,LARGE,SET IN SL: Brand: MEDLINE

## (undated) DEVICE — PROTECTOR ULN NRV PUR FOAM HK LOOP STRP ANATOMICALLY

## (undated) DEVICE — Device